# Patient Record
Sex: FEMALE | Race: BLACK OR AFRICAN AMERICAN | NOT HISPANIC OR LATINO | URBAN - METROPOLITAN AREA
[De-identification: names, ages, dates, MRNs, and addresses within clinical notes are randomized per-mention and may not be internally consistent; named-entity substitution may affect disease eponyms.]

---

## 2017-01-14 ENCOUNTER — EMERGENCY (EMERGENCY)
Facility: HOSPITAL | Age: 37
LOS: 1 days | Discharge: HOME | End: 2017-01-14
Attending: EMERGENCY MEDICINE
Payer: MEDICAID

## 2017-01-14 VITALS — HEIGHT: 65 IN | WEIGHT: 199.96 LBS

## 2017-01-14 DIAGNOSIS — J45.909 UNSPECIFIED ASTHMA, UNCOMPLICATED: ICD-10-CM

## 2017-01-14 DIAGNOSIS — F31.11 BIPOLAR DISORDER, CURRENT EPISODE MANIC WITHOUT PSYCHOTIC FEATURES, MILD: ICD-10-CM

## 2017-01-14 DIAGNOSIS — Z88.8 ALLERGY STATUS TO OTHER DRUGS, MEDICAMENTS AND BIOLOGICAL SUBSTANCES STATUS: ICD-10-CM

## 2017-01-14 DIAGNOSIS — Z91.012 ALLERGY TO EGGS: ICD-10-CM

## 2017-01-14 DIAGNOSIS — Z91.013 ALLERGY TO SEAFOOD: ICD-10-CM

## 2017-01-14 DIAGNOSIS — Z88.0 ALLERGY STATUS TO PENICILLIN: ICD-10-CM

## 2017-01-14 PROCEDURE — 94640 AIRWAY INHALATION TREATMENT: CPT

## 2017-01-14 PROCEDURE — 99283 EMERGENCY DEPT VISIT LOW MDM: CPT | Mod: 25

## 2017-01-14 PROCEDURE — 96372 THER/PROPH/DIAG INJ SC/IM: CPT

## 2017-01-14 PROCEDURE — 82962 GLUCOSE BLOOD TEST: CPT

## 2017-01-14 PROCEDURE — 99284 EMERGENCY DEPT VISIT MOD MDM: CPT | Mod: 25

## 2017-01-14 RX ORDER — ALBUTEROL 90 UG/1
2.5 AEROSOL, METERED ORAL ONCE
Qty: 0 | Refills: 0 | Status: COMPLETED | OUTPATIENT
Start: 2017-01-14 | End: 2017-01-14

## 2017-01-14 RX ORDER — DIPHENHYDRAMINE HCL 50 MG
50 CAPSULE ORAL ONCE
Qty: 0 | Refills: 0 | Status: COMPLETED | OUTPATIENT
Start: 2017-01-14 | End: 2017-01-14

## 2017-01-14 RX ORDER — ALBUTEROL 90 UG/1
2 AEROSOL, METERED ORAL
Qty: 1 | Refills: 0
Start: 2017-01-14 | End: 2017-01-21

## 2017-01-14 RX ORDER — IBUPROFEN 200 MG
600 TABLET ORAL ONCE
Qty: 0 | Refills: 0 | Status: COMPLETED | OUTPATIENT
Start: 2017-01-14 | End: 2017-01-14

## 2017-01-14 RX ADMIN — Medication 600 MILLIGRAM(S): at 03:25

## 2017-01-14 RX ADMIN — Medication 2 MILLIGRAM(S): at 02:56

## 2017-01-14 RX ADMIN — Medication 2 MILLIGRAM(S): at 02:55

## 2017-01-14 RX ADMIN — ALBUTEROL 2.5 MILLIGRAM(S): 90 AEROSOL, METERED ORAL at 03:45

## 2017-01-14 RX ADMIN — Medication 50 MILLIGRAM(S): at 02:56

## 2017-01-17 NOTE — ED PROVIDER NOTE - OBJECTIVE STATEMENT
35 yo female pmh of bipolar disorder and schizoaffective disorder comes to ed by police [place]; pt  requesting to have her medications; pt is unable to give names and dosing of medications; pt verbally abusive to staff; pt well known to ed with multiple er visits; pt redirected and agrees to have im medication  and food; pt denies suicidal or homicidal ideation at this time. pt requesting taxi for transportaton home

## 2017-01-17 NOTE — ED PROVIDER NOTE - CARE PLAN
Principal Discharge DX:	Bipolar 1 disorder, manic, mild  Secondary Diagnosis:	Uncomplicated asthma, unspecified asthma severity

## 2017-02-01 ENCOUNTER — OUTPATIENT (OUTPATIENT)
Dept: OUTPATIENT SERVICES | Facility: HOSPITAL | Age: 37
LOS: 1 days | End: 2017-02-01
Payer: MEDICAID

## 2017-02-14 DIAGNOSIS — R69 ILLNESS, UNSPECIFIED: ICD-10-CM

## 2017-08-01 PROCEDURE — G9001: CPT

## 2020-02-29 ENCOUNTER — EMERGENCY (EMERGENCY)
Facility: HOSPITAL | Age: 40
LOS: 0 days | Discharge: HOME | End: 2020-02-29
Attending: EMERGENCY MEDICINE
Payer: SELF-PAY

## 2020-02-29 VITALS
SYSTOLIC BLOOD PRESSURE: 149 MMHG | HEART RATE: 82 BPM | OXYGEN SATURATION: 99 % | RESPIRATION RATE: 18 BRPM | HEIGHT: 65 IN | DIASTOLIC BLOOD PRESSURE: 93 MMHG | WEIGHT: 209 LBS

## 2020-02-29 VITALS
SYSTOLIC BLOOD PRESSURE: 112 MMHG | RESPIRATION RATE: 17 BRPM | TEMPERATURE: 98 F | DIASTOLIC BLOOD PRESSURE: 71 MMHG | OXYGEN SATURATION: 99 % | HEART RATE: 79 BPM

## 2020-02-29 DIAGNOSIS — E11.9 TYPE 2 DIABETES MELLITUS WITHOUT COMPLICATIONS: ICD-10-CM

## 2020-02-29 DIAGNOSIS — R11.2 NAUSEA WITH VOMITING, UNSPECIFIED: ICD-10-CM

## 2020-02-29 DIAGNOSIS — Z79.84 LONG TERM (CURRENT) USE OF ORAL HYPOGLYCEMIC DRUGS: ICD-10-CM

## 2020-02-29 DIAGNOSIS — Z88.0 ALLERGY STATUS TO PENICILLIN: ICD-10-CM

## 2020-02-29 PROCEDURE — 99053 MED SERV 10PM-8AM 24 HR FAC: CPT

## 2020-02-29 PROCEDURE — 82962 GLUCOSE BLOOD TEST: CPT

## 2020-02-29 PROCEDURE — 99283 EMERGENCY DEPT VISIT LOW MDM: CPT

## 2020-02-29 RX ORDER — SODIUM CHLORIDE 9 MG/ML
1000 INJECTION INTRAMUSCULAR; INTRAVENOUS; SUBCUTANEOUS ONCE
Refills: 0 | Status: DISCONTINUED | OUTPATIENT
Start: 2020-02-29 | End: 2020-02-29

## 2020-02-29 RX ORDER — ONDANSETRON 8 MG/1
4 TABLET, FILM COATED ORAL ONCE
Refills: 0 | Status: DISCONTINUED | OUTPATIENT
Start: 2020-02-29 | End: 2020-02-29

## 2020-02-29 RX ORDER — ONDANSETRON 8 MG/1
4 TABLET, FILM COATED ORAL ONCE
Refills: 0 | Status: COMPLETED | OUTPATIENT
Start: 2020-02-29 | End: 2020-02-29

## 2020-02-29 RX ADMIN — ONDANSETRON 4 MILLIGRAM(S): 8 TABLET, FILM COATED ORAL at 04:24

## 2020-02-29 NOTE — ED PROVIDER NOTE - OBJECTIVE STATEMENT
41 y/o female in ED c/o episode of n/v tonight now resolved.   pt states visiting from [place] and ran out of her Metformin x 2 weeks.   states her FS has been normal.    pt denies any fever, HA, cp, sob, abd pain or diarrhea.   tolerating PO.   no sick contacts

## 2020-02-29 NOTE — ED PROVIDER NOTE - CONSTITUTIONAL, MLM
Well appearing, awake, alert, oriented to person, place, time/situation and in no apparent distress.  sleeping normal...

## 2020-02-29 NOTE — ED ADULT NURSE NOTE - OBJECTIVE STATEMENT
Patient complaining of nausea since 8pm, denies episodes of vomiting.  Patient sleeping/snoring immediately after speaking with patient, states she "didn't sleep tonight."  When attempting IV, patient states she "doesn't do needles."

## 2020-02-29 NOTE — ED PROVIDER NOTE - PATIENT PORTAL LINK FT
Rehab Medicine
100
You can access the FollowMyHealth Patient Portal offered by [facility] by registering at the following website: [email or web address]. By joining [employer]’s FollowMyHealth portal, you will also be able to view your health information using other applications (apps) compatible with our system.

## 2020-06-11 ENCOUNTER — EMERGENCY (EMERGENCY)
Facility: HOSPITAL | Age: 40
LOS: 1 days | Discharge: HOME | End: 2020-06-11
Attending: EMERGENCY MEDICINE
Payer: MEDICAID

## 2020-06-11 VITALS
SYSTOLIC BLOOD PRESSURE: 116 MMHG | WEIGHT: 195.11 LBS | HEART RATE: 89 BPM | OXYGEN SATURATION: 98 % | HEIGHT: 69 IN | RESPIRATION RATE: 24 BRPM | DIASTOLIC BLOOD PRESSURE: 80 MMHG

## 2020-06-11 LAB
ANION GAP SERPL CALC-SCNC: 10 MMOL/L — SIGNIFICANT CHANGE UP (ref 5–17)
APAP SERPL-MCNC: <7.5 UG/ML — LOW (ref 10–26)
BASOPHILS # BLD AUTO: 0.04 K/UL — SIGNIFICANT CHANGE UP (ref 0–0.2)
BASOPHILS NFR BLD AUTO: 0.2 % — SIGNIFICANT CHANGE UP (ref 0–2)
BUN SERPL-MCNC: 11 MG/DL — SIGNIFICANT CHANGE UP (ref 8–20)
CALCIUM SERPL-MCNC: 8.1 MG/DL — LOW (ref 8.6–10.2)
CHLORIDE SERPL-SCNC: 102 MMOL/L — SIGNIFICANT CHANGE UP (ref 98–107)
CO2 SERPL-SCNC: 25 MMOL/L — SIGNIFICANT CHANGE UP (ref 22–29)
CREAT SERPL-MCNC: 0.85 MG/DL — SIGNIFICANT CHANGE UP (ref 0.5–1.3)
EOSINOPHIL # BLD AUTO: 0.17 K/UL — SIGNIFICANT CHANGE UP (ref 0–0.5)
EOSINOPHIL NFR BLD AUTO: 1 % — SIGNIFICANT CHANGE UP (ref 0–6)
ETHANOL SERPL-MCNC: <10 MG/DL — SIGNIFICANT CHANGE UP
GLUCOSE SERPL-MCNC: 121 MG/DL — HIGH (ref 70–99)
HCG SERPL-ACNC: <4 MIU/ML — SIGNIFICANT CHANGE UP
HCT VFR BLD CALC: 34.1 % — LOW (ref 34.5–45)
HGB BLD-MCNC: 11.1 G/DL — LOW (ref 11.5–15.5)
IMM GRANULOCYTES NFR BLD AUTO: 0.4 % — SIGNIFICANT CHANGE UP (ref 0–1.5)
LYMPHOCYTES # BLD AUTO: 12.8 % — LOW (ref 13–44)
LYMPHOCYTES # BLD AUTO: 2.08 K/UL — SIGNIFICANT CHANGE UP (ref 1–3.3)
MCHC RBC-ENTMCNC: 31.9 PG — SIGNIFICANT CHANGE UP (ref 27–34)
MCHC RBC-ENTMCNC: 32.6 GM/DL — SIGNIFICANT CHANGE UP (ref 32–36)
MCV RBC AUTO: 98 FL — SIGNIFICANT CHANGE UP (ref 80–100)
MONOCYTES # BLD AUTO: 0.91 K/UL — HIGH (ref 0–0.9)
MONOCYTES NFR BLD AUTO: 5.6 % — SIGNIFICANT CHANGE UP (ref 2–14)
NEUTROPHILS # BLD AUTO: 13.01 K/UL — HIGH (ref 1.8–7.4)
NEUTROPHILS NFR BLD AUTO: 80 % — HIGH (ref 43–77)
PLATELET # BLD AUTO: 309 K/UL — SIGNIFICANT CHANGE UP (ref 150–400)
POTASSIUM SERPL-MCNC: 4 MMOL/L — SIGNIFICANT CHANGE UP (ref 3.5–5.3)
POTASSIUM SERPL-SCNC: 4 MMOL/L — SIGNIFICANT CHANGE UP (ref 3.5–5.3)
RBC # BLD: 3.48 M/UL — LOW (ref 3.8–5.2)
RBC # FLD: 13.9 % — SIGNIFICANT CHANGE UP (ref 10.3–14.5)
SALICYLATES SERPL-MCNC: <0.6 MG/DL — LOW (ref 10–20)
SODIUM SERPL-SCNC: 137 MMOL/L — SIGNIFICANT CHANGE UP (ref 135–145)
WBC # BLD: 16.27 K/UL — HIGH (ref 3.8–10.5)
WBC # FLD AUTO: 16.27 K/UL — HIGH (ref 3.8–10.5)

## 2020-06-11 PROCEDURE — 99211 OFF/OP EST MAY X REQ PHY/QHP: CPT

## 2020-06-11 PROCEDURE — 99285 EMERGENCY DEPT VISIT HI MDM: CPT | Mod: 25

## 2020-06-11 PROCEDURE — 93010 ELECTROCARDIOGRAM REPORT: CPT

## 2020-06-11 NOTE — ED PROVIDER NOTE - CLINICAL SUMMARY MEDICAL DECISION MAKING FREE TEXT BOX
39 y/o F presenting with what appears to be several self-inflicted lacerations to L forearm. Currently sedated by EMS. Will r/o medically and have pt evaluated by psych upon awakening.

## 2020-06-11 NOTE — ED ADULT NURSE NOTE - NSIMPLEMENTINTERV_GEN_ALL_ED
Implemented All Fall with Harm Risk Interventions:  [place] Line to call system. Call bell, personal items and telephone within reach. Instruct patient to call for assistance. Room bathroom lighting operational. Non-slip footwear when patient is off stretcher. Physically safe environment: no spills, clutter or unnecessary equipment. Stretcher in lowest position, wheels locked, appropriate side rails in place. Provide visual cue, wrist band, yellow gown, etc. Monitor gait and stability. Monitor for mental status changes and reorient to person, place, and time. Review medications for side effects contributing to fall risk. Reinforce activity limits and safety measures with patient and family. Provide visual clues: red socks.

## 2020-06-11 NOTE — ED ADULT NURSE REASSESSMENT NOTE - NS ED NURSE REASSESS COMMENT FT1
Received report from offgoing RN. Charting as noted. Patient in yellow gown and in no apparent distress. Patient sleeping at this time. Arousable to verbal stimuli. No signs of pain or discomfort noted. Patient remains on cardiac monitor with continuous pulse ox. See flowsheet as per vital signs. 1:1 remains at bedside for patient safety.

## 2020-06-11 NOTE — ED PROVIDER NOTE - PHYSICAL EXAMINATION
Gen: sedated  Head: normocephalic, atraumatic  Lung: CTAB, no respiratory distress, no wheezing, rales, rhonchi  CV: normal s1/s2, rrr  Abd: soft, non-distended  MSK: No edema, no visible deformities, full range of motion in all 4 extremities  Neuro: No focal neurologic deficits  Skin: No rash, multiple   Psych: normal affect Gen: sedated  Head: normocephalic, atraumatic  Lung: CTAB, no respiratory distress, no wheezing, rales, rhonchi  CV: normal s1/s2, rrr  Abd: soft, non-distended  MSK: No edema, no visible deformities, full range of motion in all 4 extremities  Neuro: No focal neurologic deficits  Skin: No rash, multiple self inflicted linear lacerations to volar aspect of L forearm, most are superficial, there is 1 deep 4cm laceration that appears old with no active bleeding, healing wounds to medial aspect of R ankle, no surrounding warmth, edema, or erythema  Psych: normal affect
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
## 2020-06-11 NOTE — ED PROVIDER NOTE - PATIENT PORTAL LINK FT
You can access the FollowMyHealth Patient Portal offered by [facility] by registering at the following website: [email or web address]. By joining [employer]’s FollowMyHealth portal, you will also be able to view your health information using other applications (apps) compatible with our system.
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
## 2020-06-11 NOTE — ED PROVIDER NOTE - OBJECTIVE STATEMENT
39 y/o F pt BIBA and PD for a psychiatric evaluation. Per EMS they state that pt became agitated at [facility] facility, PD was called, but pt was brought to the ED due to multiple lacerations to the L forarm and wound to R heel. Per EMS pt was severely agitated and received versed and haldol prior to arrival. Pt currently sedated and protecting airway. Hx limited 2/2 pt's condition.

## 2020-06-11 NOTE — ED ADULT NURSE NOTE - OBJECTIVE STATEMENT
Assumed pt care at 1245, pt received from ambulance RN, pt is sedated at this time.  Pt had received sedatives by EMS.  Pt is responsive to tactile stimuli, unable to stay awake.  CM in place, VSS, NSR.  Pt has multiple lacerations to bilat upper ext and bilat lower ankles.

## 2020-06-11 NOTE — ED BEHAVIORAL HEALTH NOTE - BEHAVIORAL HEALTH NOTE
Attempted eval earlier today requested by ED attending due to laceration to wrists, but was unable due to sedation.  Attempted again at 2100, and Pt was arousable but refused to cooperate, or keep eyes opened.  Per 1 to 1 Pt became agitated in ED earlier around change of shift and when I attempted to arrouse her as well.  Will follow.

## 2020-06-12 DIAGNOSIS — R41.82 ALTERED MENTAL STATUS, UNSPECIFIED: ICD-10-CM

## 2020-06-12 LAB
ANION GAP SERPL CALC-SCNC: 9 MMOL/L — SIGNIFICANT CHANGE UP (ref 5–17)
BASOPHILS # BLD AUTO: 0.04 K/UL — SIGNIFICANT CHANGE UP (ref 0–0.2)
BASOPHILS NFR BLD AUTO: 0.4 % — SIGNIFICANT CHANGE UP (ref 0–2)
BUN SERPL-MCNC: 9 MG/DL — SIGNIFICANT CHANGE UP (ref 8–20)
CALCIUM SERPL-MCNC: 8.2 MG/DL — LOW (ref 8.6–10.2)
CHLORIDE SERPL-SCNC: 102 MMOL/L — SIGNIFICANT CHANGE UP (ref 98–107)
CO2 SERPL-SCNC: 26 MMOL/L — SIGNIFICANT CHANGE UP (ref 22–29)
CREAT SERPL-MCNC: 0.79 MG/DL — SIGNIFICANT CHANGE UP (ref 0.5–1.3)
EOSINOPHIL # BLD AUTO: 0.19 K/UL — SIGNIFICANT CHANGE UP (ref 0–0.5)
EOSINOPHIL NFR BLD AUTO: 1.9 % — SIGNIFICANT CHANGE UP (ref 0–6)
GLUCOSE SERPL-MCNC: 96 MG/DL — SIGNIFICANT CHANGE UP (ref 70–99)
HCT VFR BLD CALC: 39.8 % — SIGNIFICANT CHANGE UP (ref 34.5–45)
HGB BLD-MCNC: 12.6 G/DL — SIGNIFICANT CHANGE UP (ref 11.5–15.5)
IMM GRANULOCYTES NFR BLD AUTO: 0.5 % — SIGNIFICANT CHANGE UP (ref 0–1.5)
LYMPHOCYTES # BLD AUTO: 1.94 K/UL — SIGNIFICANT CHANGE UP (ref 1–3.3)
LYMPHOCYTES # BLD AUTO: 19 % — SIGNIFICANT CHANGE UP (ref 13–44)
MCHC RBC-ENTMCNC: 31.1 PG — SIGNIFICANT CHANGE UP (ref 27–34)
MCHC RBC-ENTMCNC: 31.7 GM/DL — LOW (ref 32–36)
MCV RBC AUTO: 98.3 FL — SIGNIFICANT CHANGE UP (ref 80–100)
MONOCYTES # BLD AUTO: 0.75 K/UL — SIGNIFICANT CHANGE UP (ref 0–0.9)
MONOCYTES NFR BLD AUTO: 7.3 % — SIGNIFICANT CHANGE UP (ref 2–14)
NEUTROPHILS # BLD AUTO: 7.26 K/UL — SIGNIFICANT CHANGE UP (ref 1.8–7.4)
NEUTROPHILS NFR BLD AUTO: 70.9 % — SIGNIFICANT CHANGE UP (ref 43–77)
PLATELET # BLD AUTO: 316 K/UL — SIGNIFICANT CHANGE UP (ref 150–400)
POTASSIUM SERPL-MCNC: 4.5 MMOL/L — SIGNIFICANT CHANGE UP (ref 3.5–5.3)
POTASSIUM SERPL-SCNC: 4.5 MMOL/L — SIGNIFICANT CHANGE UP (ref 3.5–5.3)
RBC # BLD: 4.05 M/UL — SIGNIFICANT CHANGE UP (ref 3.8–5.2)
RBC # FLD: 13.9 % — SIGNIFICANT CHANGE UP (ref 10.3–14.5)
SARS-COV-2 RNA SPEC QL NAA+PROBE: SIGNIFICANT CHANGE UP
SODIUM SERPL-SCNC: 136 MMOL/L — SIGNIFICANT CHANGE UP (ref 135–145)
WBC # BLD: 10.23 K/UL — SIGNIFICANT CHANGE UP (ref 3.8–10.5)
WBC # FLD AUTO: 10.23 K/UL — SIGNIFICANT CHANGE UP (ref 3.8–10.5)

## 2020-06-12 PROCEDURE — 90792 PSYCH DIAG EVAL W/MED SRVCS: CPT

## 2020-06-12 PROCEDURE — 71045 X-RAY EXAM CHEST 1 VIEW: CPT | Mod: 26

## 2020-06-12 RX ORDER — KETAMINE HYDROCHLORIDE 100 MG/ML
80 INJECTION INTRAMUSCULAR; INTRAVENOUS ONCE
Refills: 0 | Status: DISCONTINUED | OUTPATIENT
Start: 2020-06-12 | End: 2020-06-12

## 2020-06-12 RX ORDER — METFORMIN HYDROCHLORIDE 850 MG/1
500 TABLET ORAL
Refills: 0 | Status: DISCONTINUED | OUTPATIENT
Start: 2020-06-12 | End: 2020-06-16

## 2020-06-12 RX ORDER — OLANZAPINE 15 MG/1
10 TABLET, FILM COATED ORAL ONCE
Refills: 0 | Status: DISCONTINUED | OUTPATIENT
Start: 2020-06-12 | End: 2020-06-12

## 2020-06-12 RX ORDER — HALOPERIDOL DECANOATE 100 MG/ML
5 INJECTION INTRAMUSCULAR ONCE
Refills: 0 | Status: COMPLETED | OUTPATIENT
Start: 2020-06-12 | End: 2020-06-12

## 2020-06-12 RX ORDER — CHLORPROMAZINE HCL 10 MG
50 TABLET ORAL EVERY 4 HOURS
Refills: 0 | Status: DISCONTINUED | OUTPATIENT
Start: 2020-06-12 | End: 2020-06-16

## 2020-06-12 RX ORDER — DIPHENHYDRAMINE HCL 50 MG
50 CAPSULE ORAL ONCE
Refills: 0 | Status: COMPLETED | OUTPATIENT
Start: 2020-06-12 | End: 2020-06-12

## 2020-06-12 RX ADMIN — Medication 110 MILLIGRAM(S): at 06:30

## 2020-06-12 RX ADMIN — Medication 50 MILLIGRAM(S): at 05:12

## 2020-06-12 RX ADMIN — Medication 2 MILLIGRAM(S): at 05:12

## 2020-06-12 RX ADMIN — Medication 100 MILLIGRAM(S): at 07:30

## 2020-06-12 RX ADMIN — HALOPERIDOL DECANOATE 5 MILLIGRAM(S): 100 INJECTION INTRAMUSCULAR at 05:25

## 2020-06-12 RX ADMIN — Medication 2 MILLIGRAM(S): at 05:25

## 2020-06-12 RX ADMIN — HALOPERIDOL DECANOATE 5 MILLIGRAM(S): 100 INJECTION INTRAMUSCULAR at 05:05

## 2020-06-12 RX ADMIN — Medication 2 MILLIGRAM(S): at 05:05

## 2020-06-12 RX ADMIN — KETAMINE HYDROCHLORIDE 80 MILLIGRAM(S): 100 INJECTION INTRAMUSCULAR; INTRAVENOUS at 06:15

## 2020-06-12 NOTE — ED BEHAVIORAL HEALTH ASSESSMENT NOTE - RISK ASSESSMENT
pt erratic, impulsive, aggressive, hostile, unable/unwilling to participate fully in evaluation at this time Unable to determine Suicide Risk

## 2020-06-12 NOTE — ED ADULT NURSE REASSESSMENT NOTE - NS ED NURSE REASSESS COMMENT FT1
pt awake ambulatory to bathroom voiding. pt speech garbled difficult to understand.  attempting to medicated with vibramycin and metformin pt requesting food before meds meal tray provided pt state I cant eat that you took it off someone else bed.. assured pt it was not.  pt refusing. pt offered sandwich cracker for medication pt refusing meds even after informing the med for abx.  pt closed eyes
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
## 2020-06-12 NOTE — ED ADULT NURSE REASSESSMENT NOTE - NS ED NURSE REASSESS COMMENT FT1
Patient sedated, airway protected, monitors placed, VSS stable. INT established in R hand using aseptic technique, labs drawn and sent, flushed with 10ml NS without complication and secured with tegaderm.

## 2020-06-12 NOTE — ED ADULT NURSE REASSESSMENT NOTE - DESCRIPTION
received report received in sleeping in room sedated 1:1 observation in effect.  unable to given po medication at this time will attempt when awake

## 2020-06-12 NOTE — ED ADULT NURSE REASSESSMENT NOTE - NS ED NURSE REASSESS COMMENT FT1
assumed care of pt, received sitting on stretcher, disorganized thought, unable to comprehend speech. pt was directable, gowned and went to room on stretcher without incident. pt screened by security per [facility] protocols. 1:1 maintained for pt and others safety due to impulsiveness.

## 2020-06-12 NOTE — ED BEHAVIORAL HEALTH NOTE - BEHAVIORAL HEALTH NOTE
SWNote: pt seen by psych NP(Vicki) pt to stay overnight to reassess in the am . Per chart, pt's name is Yakelin JIMENEZ 1980. SW to follow for dispo plan. SWNote: pt seen by psych NP(Vicki) unable to eval due to irritability/falling asleep.  Pt to stay overnight to reassess in the am . Per chart, pt's name is Yakelin JIMENEZ 1980. SW to follow for dispo plan.

## 2020-06-12 NOTE — ED ADULT NURSE REASSESSMENT NOTE - NS ED NURSE REASSESS COMMENT FT1
Patient continued to yell and curse at staff. Verbal order from Julio STEEL for 2mg Ativan and 2mg Haldol given IM at this time. Patient continued to yell and curse at staff, pt pulled out IV and throwing trash on the floor. Verbal order from Julio STEEL for 2mg Ativan and 2mg Haldol given IM at this time.

## 2020-06-12 NOTE — ED ADULT NURSE REASSESSMENT NOTE - NS ED NURSE REASSESS COMMENT FT1
Patient brought to critical care by this RN, MD Kim, and Security. Care endorsed to critical care RN. Patient remains verbally abusive and agitated. Security remains at bedside. Patient sitting on edge of bed. Side rails x2 up. Red nonslip socks remain in place. Patient refusing to wear gown at this time. Safety maintained.

## 2020-06-12 NOTE — ED BEHAVIORAL HEALTH ASSESSMENT NOTE - SUMMARY
Pt arrived by EMS after being agitated at [facility] clinic, police were called and found healed laceration marks on forearms so they referred to hospital. Since being here pt has been chronically agitated and aggressive requiring multiple injections for sedation. During a period of lucidity, reports name is Yakelin Huff : [birth year]; patient is well known to ED. She has a hx of mood disorder and cocaine abuse, malingering, c/o SI to obtain housing etc. She is irritable and appears delirious at this time. She keeps falling asleep. When awake, becomes aggressive and assaultive, inappropriately exposing self, with impulsive behavior. Pt has rec'd haldol, ativan, ketamine.   Pending urinalysis/utox  pt likely withdrawing from illicit drugs  will hold for reassessment
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
## 2020-06-12 NOTE — ED ADULT NURSE REASSESSMENT NOTE - NS ED NURSE REASSESS COMMENT FT1
Patient remains verbally abusive to staff, getting out of bed, and taking gown off. MD Kim and  called to bedside.

## 2020-06-12 NOTE — ED ADULT NURSE REASSESSMENT NOTE - NS ED NURSE REASSESS COMMENT FT1
Patient verbally abusive to staff and agitated. States "I will pee in the bed if you do not get me a different gown". Educated patient on importance of yellow gown. Patient refused and urinated in bed. MD Kim made aware.

## 2020-06-12 NOTE — ED ADULT NURSE REASSESSMENT NOTE - NS ED NURSE REASSESS COMMENT FT1
Assuming care from previous RN, pt rec'd asleep and arousable, resp even and unlabored, color good, showing NSR on monitor, pt maintaining airway and room air sat well, pt with healing wounds to LFA, pt in no acute distress at this time, 1:1 Nancy at bedside for safety, awaiting psych eval and dispo, safety maintained

## 2020-06-13 LAB
AMPHET UR-MCNC: POSITIVE
APPEARANCE UR: CLEAR — SIGNIFICANT CHANGE UP
BARBITURATES UR SCN-MCNC: NEGATIVE — SIGNIFICANT CHANGE UP
BENZODIAZ UR-MCNC: NEGATIVE — SIGNIFICANT CHANGE UP
BILIRUB UR-MCNC: NEGATIVE — SIGNIFICANT CHANGE UP
COCAINE METAB.OTHER UR-MCNC: POSITIVE
COLOR SPEC: YELLOW — SIGNIFICANT CHANGE UP
DIFF PNL FLD: ABNORMAL
EPI CELLS # UR: SIGNIFICANT CHANGE UP
GLUCOSE UR QL: NEGATIVE MG/DL — SIGNIFICANT CHANGE UP
KETONES UR-MCNC: NEGATIVE — SIGNIFICANT CHANGE UP
LEUKOCYTE ESTERASE UR-ACNC: ABNORMAL
METHADONE UR-MCNC: NEGATIVE — SIGNIFICANT CHANGE UP
NITRITE UR-MCNC: NEGATIVE — SIGNIFICANT CHANGE UP
OPIATES UR-MCNC: NEGATIVE — SIGNIFICANT CHANGE UP
PCP SPEC-MCNC: SIGNIFICANT CHANGE UP
PCP UR-MCNC: NEGATIVE — SIGNIFICANT CHANGE UP
PH UR: 5 — SIGNIFICANT CHANGE UP (ref 5–8)
PROT UR-MCNC: 15 MG/DL
RBC CASTS # UR COMP ASSIST: SIGNIFICANT CHANGE UP /HPF (ref 0–4)
SP GR SPEC: 1.02 — SIGNIFICANT CHANGE UP (ref 1.01–1.02)
THC UR QL: NEGATIVE — SIGNIFICANT CHANGE UP
UROBILINOGEN FLD QL: 4 MG/DL
WBC UR QL: SIGNIFICANT CHANGE UP

## 2020-06-13 RX ORDER — ACETAMINOPHEN 500 MG
650 TABLET ORAL ONCE
Refills: 0 | Status: COMPLETED | OUTPATIENT
Start: 2020-06-13 | End: 2020-06-13

## 2020-06-13 RX ADMIN — Medication 50 MILLIGRAM(S): at 15:40

## 2020-06-13 RX ADMIN — Medication 650 MILLIGRAM(S): at 22:53

## 2020-06-13 RX ADMIN — Medication 2 MILLIGRAM(S): at 09:30

## 2020-06-13 RX ADMIN — Medication 50 MILLIGRAM(S): at 04:09

## 2020-06-13 RX ADMIN — Medication 2 MILLIGRAM(S): at 22:54

## 2020-06-13 NOTE — ED BEHAVIORAL HEALTH NOTE - BEHAVIORAL HEALTH NOTE
This is a 49-year-old, single, mother of 2 (ages 7 and 18); non-caregiver, unemployed, domiciled alone; with a psychiatric history of Bipolar disorder; history of past psychiatric admissions; history of Alcohol and illicit drug use; brought in by EMS after the Police was called when patient became agitated at [facility] clinic. However, patient was not arrested. Patient was evaluated seen by psychiatric NP (Chandana) and was kept for reassessment. Patient was seen this morning by undersigned for dispo status.    On assessment patient is notably disorganised; with illogical and delusional thought process; unable to complete psychiatric assessment in one sitting. Required redirection to remain focus. She is noted to have multiple healed and fresh lacerations on forearms. Per ED note, patient has been agitated throughout her ED stay, which required several stat medications for agitation. Per record, patient is known to the [facility], and has had several ED visits as well as inpatient psychiatric admissions. Per collateral information this writer obtained from patient' mother (Cesia Huff [phone]). According to mother, patient was diagnosed with Bipolar disorder and has 2 children (ages 7 and 18). Patient does not live with his mother, but lives alone. Per record, she has been prescribed Latuda, Topamax and gabapentin in the past.    At 1:45pm: Patient continues to exhibit inappropriate behaviors as evidenced by exposing herself while walking back and forth with the 1:1 that is monitoring her. Patient is not cooperative with psychiatric assessment, she is highly irritable and disorganized; with delusional thought content. . Upon asking her about past suicidal attempts, she replied "yes; I tried to kill myself", and further noted "you was there; you don't remember?. Patient' speech is unclear and difficult to understand at times. She was not able to state when she was upon assessing her. She is very impulsive in her behaviors, and walks aimlessly back and forth throughout the ED. She requested to use the phone to call her mother then proceeded to call 911 and asking for the Police to come get her while giving her location as her room number, namely [facility]. However, she refused to give urine for drug screening upon asking her for a urine sample. She states "why you need urine?, and denies recent drug use.  Nonetheless, she is very disorganized and unable to reliably contract for safety. She is not able to care for herself and has lost custody of her children due to her mental health issues according to her mother (Cesia). Patient appears to be an acute risk to self, and  will benefit from inpatient psychiatric admission for stabilization and safety.     Dx: Bipolar disorder, current episode manic, severe, with psychotic features (F31.2)     PLAN:   1. Admit 2PC to inpatient psychiatry when a  bed becomes available.   2. Will continue on 1:1 observation until transfer to inpatient psychiatry.  3. Will continue Ativan 2mg Q 6 hours as needed for anxiety.  4. Inpatient psychiatry will initiate additional psychotropic meds as deemed appropriate.    Vital Signs Last 24 Hrs  T(C): 37.2 (12 Jun 2020 21:30), Max: 37.2 (12 Jun 2020 21:30)  T(F): 98.9 (12 Jun 2020 21:30), Max: 98.9 (12 Jun 2020 21:30)  HR: 109 (13 Jun 2020 12:46) (91 - 109)  BP: 102/69 (12 Jun 2020 21:30) (102/69 - 102/69)  BP(mean): --  RR: 19 (13 Jun 2020 12:46) (18 - 19)  SpO2: 98% (13 Jun 2020 12:46) (96% - 98%)    CBC Full  -  ( 12 Jun 2020 06:52 )  WBC Count : 10.23 K/uL  RBC Count : 4.05 M/uL  Hemoglobin : 12.6 g/dL  Hematocrit : 39.8 %  Platelet Count - Automated : 316 K/uL  Mean Cell Volume : 98.3 fl  Mean Cell Hemoglobin : 31.1 pg  Mean Cell Hemoglobin Concentration : 31.7 gm/dL  Auto Neutrophil # : 7.26 K/uL  Auto Lymphocyte # : 1.94 K/uL  Auto Monocyte # : 0.75 K/uL  Auto Eosinophil # : 0.19 K/uL  Auto Basophil # : 0.04 K/uL  Auto Neutrophil % : 70.9 %  Auto Lymphocyte % : 19.0 %  Auto Monocyte % : 7.3 %  Auto Eosinophil % : 1.9 %  Auto Basophil % : 0.4 %  06-12    136  |  102  |  9.0  ----------------------------<  96  4.5   |  26.0  |  0.79    Ca    8.2<L>      12 Jun 2020 06:52

## 2020-06-13 NOTE — ED ADULT NURSE REASSESSMENT NOTE - NS ED NURSE REASSESS COMMENT FT1
Assumed care of patient. patient alert and remains uncooperative. patient remains on 1:1 for patient safety.  patient requesting multiple time for food and po fluids. patient exiting room with gown open to the front. patient instructed that she is not appropriated and that if she leaves her room she must have gown coving her front.  patient states understanding of same.  Will monitor and chart changes maintaied on 1:1 and maintain safe environment

## 2020-06-13 NOTE — ED ADULT NURSE REASSESSMENT NOTE - NS ED NURSE REASSESS COMMENT FT1
Pt became agitated, banging on doors, threatening staff. deescalation techniques used unsuccessfully. Code gray activated. Pt medicated w/ Thorazine 50mg IM  for safety. Pt currently resting/ sleeping comfortably, in no distress. No harm to self or others. Safety maintained.

## 2020-06-13 NOTE — ED ADULT NURSE REASSESSMENT NOTE - NS ED NURSE REASSESS COMMENT FT1
assumed care of pt in BH area. pt pacing around in common area, refusing medications. breakfast provided. pt with lacerations to left forearm, holding guaze on it, pt showing to RN but refusing care to the wound. pt remains on 1:1 observation. will continue to monitor. assumed care of pt in  area. pt pacing around in common area, refusing medications. breakfast provided. difficulty to understand slurred quick speech. pt with lacerations to left forearm, holding guaze on it, pt showing to RN but refusing care to the wound. pt remains on 1:1 observation. will continue to monitor.

## 2020-06-13 NOTE — ED ADULT NURSE REASSESSMENT NOTE - REASSESS COMMUNICATION
pt continues to pace around ED, undressing from gown, remains with many request. 1:1 remains with pt. will continue to monitor.

## 2020-06-13 NOTE — ED ADULT NURSE REASSESSMENT NOTE - REASSESS COMMUNICATION
pt took off bandage to left forearm, refusing to let RN address the multiple lacerations, including one large one appears to be healing. pt refusing VS. pt took off bandage to left forearm, refusing to let RN address the multiple lacerations present, including one large one appears to be old and in the healing process. multiple scars present to left forearm as well. pt refusing VS.

## 2020-06-13 NOTE — ED ADULT NURSE REASSESSMENT NOTE - NS ED NURSE REASSESS COMMENT FT1
pt awoke speech remains garbled difficult to understand. rambling at times.  escorted to br. return to hallway by room.  pt undressed instructed to put gown back on pt refusing to put on the yellow thing.  walking in unit nude security called.   pt babbling walked back into room refused to put gown on.  returned to bed covered self closed eyes. will continue to monitor

## 2020-06-13 NOTE — ED ADULT NURSE REASSESSMENT NOTE - NS ED NURSE REASSESS COMMENT FT1
awake oob standing w/out clothing on garbled speech. made aware will se mir in am.  pt requesting phone inform once she puts on gown she will received phone  stating I am not putting it on.  pt climbed back into bed eyes closed.

## 2020-06-13 NOTE — ED ADULT NURSE REASSESSMENT NOTE - NS ED NURSE REASSESS COMMENT FT1
patient being loud requesting to bath  patient assisted as needed. patient remains on 1:1 ambulating without difficulties.  patient having disorganized and illogical thoughts at time patient needing redirection will monitor and chart changes maintain safe environment

## 2020-06-13 NOTE — CHART NOTE - NSCHARTNOTEFT_GEN_A_CORE
RAYMON Note: Per psych team, pt is in need of inpt psych trx on involuntary status. Pt listed as self pay- pt unable to meaningfully engage In conversations regarding insurance. RAYMON paged MARGARITA at [facility] to inquire about bed availability- received call back from MARGARITA Stahl- who reports there are no beds available today, RAYMON continues to follow for safe trx RAYMON Note: Per psych team, pt is in need of inpt psych trx on involuntary status, 2PC legals completed and on chart. Pt listed as self pay- pt stating she has no insurance. RAYMON paged A.MO at [facility] to inquire about bed availability- received call back from MARGARITA Stahl- who reports there are no beds available today, advised to call tomorrow. RAYMON continues to follow for safe trx

## 2020-06-13 NOTE — ED ADULT NURSE REASSESSMENT NOTE - NS ED NURSE REASSESS COMMENT FT1
patient requesting that wound to left forearm be cover and nonadhersive dressing and gauze pad applied no redness or drainage noted. patient to bed maintained on 1:1.  will monitor and chart changes maintain safe environment

## 2020-06-14 RX ORDER — QUETIAPINE FUMARATE 200 MG/1
50 TABLET, FILM COATED ORAL ONCE
Refills: 0 | Status: COMPLETED | OUTPATIENT
Start: 2020-06-14 | End: 2020-06-14

## 2020-06-14 RX ORDER — DIPHENHYDRAMINE HCL 50 MG
50 CAPSULE ORAL EVERY 6 HOURS
Refills: 0 | Status: DISCONTINUED | OUTPATIENT
Start: 2020-06-14 | End: 2020-06-16

## 2020-06-14 RX ORDER — HALOPERIDOL DECANOATE 100 MG/ML
5 INJECTION INTRAMUSCULAR EVERY 6 HOURS
Refills: 0 | Status: DISCONTINUED | OUTPATIENT
Start: 2020-06-14 | End: 2020-06-16

## 2020-06-14 RX ADMIN — Medication 50 MILLIGRAM(S): at 14:24

## 2020-06-14 RX ADMIN — Medication 100 MILLIGRAM(S): at 05:48

## 2020-06-14 RX ADMIN — Medication 500 MILLIGRAM(S): at 04:34

## 2020-06-14 RX ADMIN — Medication 50 MILLIGRAM(S): at 14:25

## 2020-06-14 RX ADMIN — Medication 2 MILLIGRAM(S): at 14:26

## 2020-06-14 RX ADMIN — QUETIAPINE FUMARATE 50 MILLIGRAM(S): 200 TABLET, FILM COATED ORAL at 11:10

## 2020-06-14 RX ADMIN — Medication 50 MILLIGRAM(S): at 01:10

## 2020-06-14 RX ADMIN — Medication 100 MILLIGRAM(S): at 21:20

## 2020-06-14 RX ADMIN — HALOPERIDOL DECANOATE 5 MILLIGRAM(S): 100 INJECTION INTRAMUSCULAR at 14:25

## 2020-06-14 RX ADMIN — METFORMIN HYDROCHLORIDE 500 MILLIGRAM(S): 850 TABLET ORAL at 05:48

## 2020-06-14 RX ADMIN — METFORMIN HYDROCHLORIDE 500 MILLIGRAM(S): 850 TABLET ORAL at 21:16

## 2020-06-14 RX ADMIN — Medication 2 MILLIGRAM(S): at 11:17

## 2020-06-14 NOTE — ED ADULT NURSE REASSESSMENT NOTE - NS ED NURSE REASSESS COMMENT FT1
patient washed self in bathroom with 1:1 present.. clean linens given patient states that she was hungry and food provided. patient

## 2020-06-14 NOTE — ED ADULT NURSE REASSESSMENT NOTE - NS ED NURSE REASSESS COMMENT FT1
pt attempting to block door with chair and placed blanket over door window.  both removed from room pt informed cant block door.  pt requested grill cheese sandwich dietary called tray brought to pt room
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
## 2020-06-14 NOTE — ED ADULT NURSE REASSESSMENT NOTE - NS ED NURSE REASSESS COMMENT FT1
Pt with 1;1 observation in place. Pt continues to pace intermittently throughout unit. Pt requesting to be re-evaluated because she states that she is ready to go home. pt states that has not slept in 2 days. Encouraged to try to rest. Pt otherwise calm and cooperative with treatment plan. Ongoing evaluation in progress.

## 2020-06-14 NOTE — ED ADULT NURSE REASSESSMENT NOTE - NS ED NURSE REASSESS COMMENT FT1
walking around unit somewhat quieter. no further yelling or pacing. requesting and received po fluid and snack

## 2020-06-14 NOTE — ED ADULT NURSE REASSESSMENT NOTE - NS ED NURSE REASSESS COMMENT FT1
patient states that she had copd and is having difficulty breathing v/s taken and Dr. Benson notified

## 2020-06-14 NOTE — ED ADULT NURSE REASSESSMENT NOTE - NS ED NURSE REASSESS COMMENT FT1
patient sleeping with 1:1 at bedside.  side rails up. Will monitor and chart changes and maintain safe environment

## 2020-06-14 NOTE — ED ADULT NURSE REASSESSMENT NOTE - NS ED NURSE REASSESS COMMENT FT1
awake search in room for something pt not talking with writer at this time left wandering in room searching taking gown on off

## 2020-06-14 NOTE — ED ADULT NURSE REASSESSMENT NOTE - NS ED NURSE REASSESS COMMENT FT1
pt droswy ambulating in room. multiple attempts made encouraging pt to sit down or lay down.  pt becoming louder stating don't tell me what to do

## 2020-06-14 NOTE — CHART NOTE - NSCHARTNOTEFT_GEN_A_CORE
SW Note: Pt is in need of inpt psych bed on involuntary status. Pt is uninsured. Per PHILL Hamlin, no beds are available. SW expanded search, reached out to all facilities in [facility] ([facility], [facility], [facility], [facility], [facility], [facility], [facility] and [facility] currently closed due to covid) as well as [facility] and [facility], no bed availability today. RAYMON continues to search for inpt bed

## 2020-06-14 NOTE — ED ADULT NURSE REASSESSMENT NOTE - NS ED NURSE REASSESS COMMENT FT1
received report pt received ambulatory in unit one to one in effect.  pt is intrusive manic.  speech is improved.  pt is demanding.  constantly requiring attention.  conversation is inappropriate  at times.  attempting to set limits 1:1 observation in effect . pt medicated. with seroquel at  np nicoles request.  pt making deals for taking medication. etc.

## 2020-06-14 NOTE — ED ADULT NURSE REASSESSMENT NOTE - NS ED NURSE REASSESS COMMENT FT1
pt awake walking around yelling at staff afgain and knocking on glass wanting to speak with sw again.  has spoke with sw but denies.  pt returned to room laying down

## 2020-06-14 NOTE — ED BEHAVIORAL HEALTH NOTE - BEHAVIORAL HEALTH NOTE
PROGRESS NOTE: 20 @ 22:35  	  • Reason for Ongoing Consultation: 	    ID: 40yyo Female with HEALTH ISSUES - PROBLEM Dx:  Altered mental status, unspecified altered mental status type          INTERVAL DATA:   • Interval Chief Complaint:  "I'm not crazy.  I did crack.  That short ben here gave me ecstasy".  Pt continues pacing and intrusive, banging on windows.  Pt loud at times shouting at 1 to 1.  Continues exposing herself and requires a lot of redirection regarding clothing and keeping herself covered.  Pt asking repeatedly for food.  Pt required IM sedation earlier today and slept for a short while.  Speech continues garbled but less pressured.  Continues Labile.  REVIEW OF SYSTEMS:   • Constitutional Symptoms	No complaints  • Eyes	No complaints  • Ears / Nose / Throat / Mouth	No complaints  • Cardiovascular	No complaints  • Respiratory	No complaints  • Gastrointestinal	No complaints  • Genitourinary	No complaints  • Musculoskeletal	No complaints  • Skin	No complaints  • Neurological	No complaints  • Psychiatric (see HPI)	See HPI  • Endocrine	No complaints  • Hematologic / Lymphatic	No complaints  • Allergic / Immunologic	No complaints    REVIEW OF VITALS/LABS/IMAGING/INVESTIGATIONS:   • Vital signs reviewed: Yes  • Vital Signs:	    T(C): 36.4 (20 @ 15:49), Max: 37.3 (20 @ 00:39)  HR: 98 (20 @ 15:49) (98 - 117)  BP: 146/88 (20 @ 15:49) (114/77 - 146/88)  RR: 17 (20 @ 15:49) (17 - 19)  SpO2: 99% (20 @ 15:49) (98% - 100%)    • Available labs reviewed: Yes  • Available Lab Results:                 Urinalysis Basic - ( 2020 22:19 )    Color: Yellow / Appearance: Clear / S.025 / pH: x  Gluc: x / Ketone: Negative  / Bili: Negative / Urobili: 4 mg/dL   Blood: x / Protein: 15 mg/dL / Nitrite: Negative   Leuk Esterase: Trace / RBC: 0-2 /HPF / WBC 0-2   Sq Epi: x / Non Sq Epi: Few / Bacteria: x          MEDICATIONS:      PRN Medications: Haldol 5 IM Ativan 2 IM and Benadryl 50 IM for agitation  • PRN Medications since last evaluation	  • PRN Details	    Current Medications:   chlorproMAZINE    Injectable 50 milliGRAM(s) IntraMuscular every 4 hours PRN  diphenhydrAMINE   Injectable 50 milliGRAM(s) IntraMuscular every 6 hours PRN  doxycycline hyclate Capsule 100 milliGRAM(s) Oral every 12 hours  haloperidol    Injectable 5 milliGRAM(s) IntraMuscular every 6 hours PRN  LORazepam     Tablet 2 milliGRAM(s) Oral every 6 hours PRN  LORazepam   Injectable 2 milliGRAM(s) IntraMuscular every 6 hours PRN  metFORMIN 500 milliGRAM(s) Oral two times a day     Medication Side Effects:  • Medication Side Effects or Adverse Reactions (new or ongoing)	None known    MENTAL STATUS EXAM:   • Level of Consciousness	Alert  • General Appearance	Well developed  • Body Habitus	Well nourished  • Hygiene	fair  • Grooming	poor  • Behavior	min Cooperative  • Eye Contact	fair  • Relatedness	poor  • Impulse Control	impaired  • Muscle Tone / Strength	Normal muscle tone/strength  • Abnormal Movements	No abnormal movements  • Gait / Station	Normal gait / station  • Speech Volume	Normal to loud  • Speech Rate	Normal  • Speech Spontaneity	Normal  • Speech Articulation	Normal  • Mood	expansive  • Affect Quality	labile   • Affect Range	Full /sedated  • Affect Congruence	Congruent  • Thought Process	disorganized  • Thought Associations	Normal  • Thought Content	Unremarkable  • Perceptions	paranoid  • Oriented to Time	no  • Oriented to Place	Yes  • Oriented to Situation	limited  • Oriented to Person	Yes  • Attention / Concentration	impaired  • Estimated Intelligence	Average  • Recent Memory	Normal  • Remote Memory	Normal  • Fund of Knowledge	impaired  • Language	No abnormalities noted  • Judgment (regarding everyday events)	poor  • Insight (regarding psychiatric illness)	Fair    SUICIDALITY:   • Suicidality (Interval)	none known    HOMICIDALITY/AGGRESSION:   • Homicidality/Aggression	none known    DIAGNOSIS DSM-V:    Psychiatric Diagnosis (Corresponds to DSM-IV Axis I, II):   HEALTH ISSUES - PROBLEM Dx:  Altered mental status, unspecified altered mental status type           Medical Diagnosis (Corresponds to DSM-IV Axis III):  • Axis III	DM      ASSESSMENT OF CURRENT CONDITION:   Summary (include case differential, formulation and patient response to therapy):   Pt rewuire in pt psych admission for mood irritability and psychosis  Risk Assessment (consider static vs modifiable risk factors and protective factors; comment on level of risk for dangerous behavior):   High risk behaviors, drug use, noncompliance  PLAN  Involuntary psych admission

## 2020-06-14 NOTE — ED ADULT NURSE REASSESSMENT NOTE - NS ED NURSE REASSESS COMMENT FT1
patient got out of bed urinated on floor.  patient with garbles pressured and garbles speech. patient redirected multiple time. patient denies eating dinner and reassured by staff member that she did. limit setting attempted. patient instructed to stay in her room.  1:1 at bedside.  Safe environment maintained

## 2020-06-15 VITALS
HEART RATE: 87 BPM | DIASTOLIC BLOOD PRESSURE: 70 MMHG | RESPIRATION RATE: 19 BRPM | SYSTOLIC BLOOD PRESSURE: 134 MMHG | OXYGEN SATURATION: 99 %

## 2020-06-15 PROCEDURE — 36415 COLL VENOUS BLD VENIPUNCTURE: CPT

## 2020-06-15 PROCEDURE — 80048 BASIC METABOLIC PNL TOTAL CA: CPT

## 2020-06-15 PROCEDURE — 71045 X-RAY EXAM CHEST 1 VIEW: CPT

## 2020-06-15 PROCEDURE — 99285 EMERGENCY DEPT VISIT HI MDM: CPT | Mod: 25

## 2020-06-15 PROCEDURE — 87635 SARS-COV-2 COVID-19 AMP PRB: CPT

## 2020-06-15 PROCEDURE — 93005 ELECTROCARDIOGRAM TRACING: CPT

## 2020-06-15 PROCEDURE — 99213 OFFICE O/P EST LOW 20 MIN: CPT

## 2020-06-15 PROCEDURE — 85027 COMPLETE CBC AUTOMATED: CPT

## 2020-06-15 PROCEDURE — 80307 DRUG TEST PRSMV CHEM ANLYZR: CPT

## 2020-06-15 PROCEDURE — 96372 THER/PROPH/DIAG INJ SC/IM: CPT | Mod: XU

## 2020-06-15 PROCEDURE — 96365 THER/PROPH/DIAG IV INF INIT: CPT

## 2020-06-15 PROCEDURE — 81001 URINALYSIS AUTO W/SCOPE: CPT

## 2020-06-15 PROCEDURE — 84702 CHORIONIC GONADOTROPIN TEST: CPT

## 2020-06-15 PROCEDURE — 96375 TX/PRO/DX INJ NEW DRUG ADDON: CPT

## 2020-06-15 RX ADMIN — Medication 2 MILLIGRAM(S): at 04:10

## 2020-06-15 RX ADMIN — HALOPERIDOL DECANOATE 5 MILLIGRAM(S): 100 INJECTION INTRAMUSCULAR at 04:10

## 2020-06-15 RX ADMIN — Medication 100 MILLIGRAM(S): at 06:54

## 2020-06-15 RX ADMIN — Medication 50 MILLIGRAM(S): at 04:10

## 2020-06-15 NOTE — CHART NOTE - NSCHARTNOTEFT_GEN_A_CORE
SW Note: pt was seen again by the  provider. Pt cleared for discharge. Pt did not wait on the unit for SW assessment for aftercare needs.

## 2020-06-15 NOTE — ED ADULT NURSE REASSESSMENT NOTE - NS ED NURSE REASSESS COMMENT FT1
patient got out of bed removed gown and then proceeded to urinate on floor and walk thought it. patient remains on 1:1 and attempted to be redirected without success patient then got back into bed and went back to sleep. environment cleaned and maintained safe

## 2020-06-15 NOTE — ED ADULT NURSE REASSESSMENT NOTE - NS ED NURSE REASSESS COMMENT FT1
Patient ate 100% of her breakfast.  No signs or symptoms noted at wound sites.  Patient safety maintained on constant observation.  Patient currently resting in bed.

## 2020-06-15 NOTE — ED ADULT NURSE REASSESSMENT NOTE - NS ED NURSE REASSESS COMMENT FT1
patient requesting dressing be applied to left forearm.  When attempted to applied patient refused patient requesting dressing be applied to left forearm.  When attempted to applied patient refused. patient speaking with pressured and garbled speech

## 2020-06-15 NOTE — ED ADULT NURSE REASSESSMENT NOTE - NS ED NURSE REASSESS COMMENT FT1
Patient ate 100% of her meal.  Constant observation discontinued.  Patient cleared for discharge by Dr. Acosta.  Patient wound dressed with sterile dressing and educated about signs and symptoms of infection.  Patient will obtain antidotic medication at [facility] post discharge.

## 2020-06-15 NOTE — ED ADULT NURSE REASSESSMENT NOTE - GENERAL PATIENT STATE
resting/sleeping
anxious
resting/sleeping
cooperative/comfortable appearance
resting/sleeping
resting/sleeping
resting/sleeping/comfortable appearance

## 2020-06-15 NOTE — ED ADULT NURSE REASSESSMENT NOTE - STATUS
HOLD for reassessment
awaiting discharge, no change
awaiting transfer, no change

## 2020-06-15 NOTE — ED ADULT NURSE REASSESSMENT NOTE - NS ED NURSE REASSESS COMMENT FT1
Assumed care of patient at 0715.  Patient currently resting in bed.  Constant observation maintained for patient safety.

## 2020-06-15 NOTE — ED BEHAVIORAL HEALTH NOTE - BEHAVIORAL HEALTH NOTE
PROGRESS NOTE: 06-15-20 @ 10:29  	  • Reason for Ongoing Consultation: 	re-evaluation    ID: 40yyo Female with HEALTH ISSUES - PROBLEM Dx:  Altered mental status, unspecified altered mental status type          INTERVAL DATA:   • Interval Chief Complaint: I ain't crazy I smoked crack The short ben gave ecstasy  • Interval History:   agitation has diminished asking for discharge Wants bandage for cuts on arm and offers to pay for her antibiotic  REVIEW OF SYSTEMS:   • Constitutional Symptoms	No complaints  • Eyes	No complaints  • Ears / Nose / Throat / Mouth	No complaints  • Cardiovascular	No complaints  • Respiratory	No complaints  • Gastrointestinal	No complaints  • Genitourinary	No complaints  • Musculoskeletal	No complaints  • Skin	No complaints  • Neurological	No complaints  • Psychiatric (see HPI)	See HPI  • Endocrine	No complaints  • Hematologic / Lymphatic	No complaints  • Allergic / Immunologic	No complaints    REVIEW OF VITALS/LABS/IMAGING/INVESTIGATIONS:   • Vital signs reviewed: Yes  • Vital Signs:	    T(C): 36.4 (20 @ 15:49), Max: 36.4 (20 @ 15:49)  HR: 87 (06-15-20 @ 04:00) (87 - 98)  BP: 134/70 (06-15-20 @ 04:00) (134/70 - 146/88)  RR: 19 (06-15-20 @ 04:00) (17 - 19)  SpO2: 99% (06-15-20 @ 04:00) (99% - 99%)    • Available labs reviewed: Yes  • Available Lab Results:                 Urinalysis Basic - ( 2020 22:19 )    Color: Yellow / Appearance: Clear / S.025 / pH: x  Gluc: x / Ketone: Negative  / Bili: Negative / Urobili: 4 mg/dL   Blood: x / Protein: 15 mg/dL / Nitrite: Negative   Leuk Esterase: Trace / RBC: 0-2 /HPF / WBC 0-2   Sq Epi: x / Non Sq Epi: Few / Bacteria: x          MEDICATIONS:      PRN Medications:  • PRN Medications since last evaluation	  • PRN Details	    Current Medications:   chlorpromazine    Injectable 50 milliGRAM(s) IntraMuscular every 4 hours PRN  diphenhydramine   Injectable 50 milliGRAM(s) IntraMuscular every 6 hours PRN  doxycycline hyclate Capsule 100 milliGRAM(s) Oral every 12 hours  haloperidol    Injectable 5 milliGRAM(s) IntraMuscular every 6 hours PRN  lorazepam     Tablet 2 milliGRAM(s) Oral every 6 hours PRN  lorazepam   Injectable 2 milliGRAM(s) IntraMuscular every 6 hours PRN  metformin 500 milliGRAM(s) Oral two times a day     Medication Side Effects:  • Medication Side Effects or Adverse Reactions (new or ongoing)	None known    MENTAL STATUS EXAM:   • Level of Consciousness	Alert  • General Appearance	Well developed  • Body Habitus	Well nourished  • Hygiene	fair  • Grooming	fair  • Behavior	Cooperative  • Eye Contact	Good  • Relatedness	Good  • Impulse Control	Normal  • Muscle Tone / Strength	Normal muscle tone/strength  • Abnormal Movements	No abnormal movements  • Gait / Station	Normal gait / station  • Speech Volume	Normal  • Speech Rate	Normal  • Speech Spontaneity	Normal  • Speech Articulation	Normal  • Mood	Normal  • Affect Quality	Euthymic  • Affect Range	Full  • Affect Congruence	Congruent  • Thought Process	Linear  • Thought Associations	Normal  • Thought Content	Unremarkable  • Perceptions	No abnormalities  • Oriented to Time	Yes  • Oriented to Place	Yes  • Oriented to Situation	Yes  • Oriented to Person	Yes  • Attention / Concentration	Normal  • Estimated Intelligence	Average  • Recent Memory	Normal  • Remote Memory	Normal  • Fund of Knowledge	Normal  • Language	No abnormalities noted  • Judgment (regarding everyday events)	Fair  • Insight (regarding psychiatric illness)	Fair    SUICIDALITY:   • Suicidality (Interval)	none known    HOMICIDALITY/AGGRESSION:   • Homicidality/Aggression	none known    DIAGNOSIS DSM-V:    Psychiatric Diagnosis (Corresponds to DSM-IV Axis I, II):   HEALTH ISSUES - PROBLEM Dx:  Altered mental status, unspecified altered mental status type           Medical Diagnosis (Corresponds to DSM-IV Axis III):  • Axis III	  type 2 diabetes    ASSESSMENT OF CURRENT CONDITION:   Summary (include case differential, formulation and patient response to therapy): drug induced state has resolved    Risk Assessment (consider static vs modifiable risk factors and protective factors; comment on level of risk for dangerous behavior): low suicidal risk    PLAN  discharge home today  outpatient substance abuse services  Rx sent to [facility] for doxycycline

## 2020-06-15 NOTE — ED ADULT NURSE REASSESSMENT NOTE - NS ED NURSE REASSESS COMMENT FT1
patient out of bed being uncooperative requesting food and when given what was available patient started to get aggressive. Security called;  patient threw food that was offered. patient refusing to wear gown patient instructed that she has to stay in room if not wearing gown.  patient ambulated to bathroom with gown on and patient directed to room and  medicated for agitation with security present.  patient removed dressing to left forearm and refused to have new dressing applied. patient out of bed being uncooperative requesting food and when given what was available patient started to get aggressive. Security called;  patient threw food that was offered. patient refusing to wear gown patient instructed that she has to stay in room if not wearing gown.  patient ambulated to bathroom with gown on and patient directed to room and  medicated for agitation with security present.  patient removed dressing to left forearm and refused to have new dressing applied.  patient maintained on 1:1 for safety

## 2020-06-15 NOTE — ED ADULT NURSE REASSESSMENT NOTE - COMFORT CARE
meal provided
meal provided
darkened lights
meal provided/plan of care explained
plan of care explained/meal provided
side rails up/warm blanket provided

## 2020-06-17 ENCOUNTER — EMERGENCY (EMERGENCY)
Facility: HOSPITAL | Age: 40
LOS: 1 days | Discharge: HOME | End: 2020-06-17
Attending: EMERGENCY MEDICINE
Payer: MEDICAID

## 2020-06-17 LAB
ALBUMIN SERPL ELPH-MCNC: 3.3 G/DL — SIGNIFICANT CHANGE UP (ref 3.3–5.2)
ALP SERPL-CCNC: 42 U/L — SIGNIFICANT CHANGE UP (ref 40–120)
ALT FLD-CCNC: 19 U/L — SIGNIFICANT CHANGE UP
ANION GAP SERPL CALC-SCNC: 12 MMOL/L — SIGNIFICANT CHANGE UP (ref 5–17)
APAP SERPL-MCNC: <7.5 UG/ML — LOW (ref 10–26)
AST SERPL-CCNC: 32 U/L — HIGH
BILIRUB SERPL-MCNC: <0.2 MG/DL — LOW (ref 0.4–2)
BUN SERPL-MCNC: 25 MG/DL — HIGH (ref 8–20)
CALCIUM SERPL-MCNC: 9.1 MG/DL — SIGNIFICANT CHANGE UP (ref 8.6–10.2)
CHLORIDE SERPL-SCNC: 103 MMOL/L — SIGNIFICANT CHANGE UP (ref 98–107)
CO2 SERPL-SCNC: 23 MMOL/L — SIGNIFICANT CHANGE UP (ref 22–29)
CREAT SERPL-MCNC: 1.12 MG/DL — SIGNIFICANT CHANGE UP (ref 0.5–1.3)
ETHANOL SERPL-MCNC: <10 MG/DL — SIGNIFICANT CHANGE UP
GLUCOSE SERPL-MCNC: 127 MG/DL — HIGH (ref 70–99)
HCT VFR BLD CALC: 34.9 % — SIGNIFICANT CHANGE UP (ref 34.5–45)
HGB BLD-MCNC: 11 G/DL — LOW (ref 11.5–15.5)
MCHC RBC-ENTMCNC: 30.8 PG — SIGNIFICANT CHANGE UP (ref 27–34)
MCHC RBC-ENTMCNC: 31.5 GM/DL — LOW (ref 32–36)
MCV RBC AUTO: 97.8 FL — SIGNIFICANT CHANGE UP (ref 80–100)
PLATELET # BLD AUTO: 391 K/UL — SIGNIFICANT CHANGE UP (ref 150–400)
POTASSIUM SERPL-MCNC: 4 MMOL/L — SIGNIFICANT CHANGE UP (ref 3.5–5.3)
POTASSIUM SERPL-SCNC: 4 MMOL/L — SIGNIFICANT CHANGE UP (ref 3.5–5.3)
PROT SERPL-MCNC: 7.3 G/DL — SIGNIFICANT CHANGE UP (ref 6.6–8.7)
RBC # BLD: 3.57 M/UL — LOW (ref 3.8–5.2)
RBC # FLD: 13.4 % — SIGNIFICANT CHANGE UP (ref 10.3–14.5)
SALICYLATES SERPL-MCNC: <0.6 MG/DL — LOW (ref 10–20)
SODIUM SERPL-SCNC: 138 MMOL/L — SIGNIFICANT CHANGE UP (ref 135–145)
WBC # BLD: 8.79 K/UL — SIGNIFICANT CHANGE UP (ref 3.8–10.5)
WBC # FLD AUTO: 8.79 K/UL — SIGNIFICANT CHANGE UP (ref 3.8–10.5)

## 2020-06-17 PROCEDURE — 99285 EMERGENCY DEPT VISIT HI MDM: CPT | Mod: 25

## 2020-06-17 PROCEDURE — 72170 X-RAY EXAM OF PELVIS: CPT | Mod: 26

## 2020-06-17 PROCEDURE — 12002 RPR S/N/AX/GEN/TRNK2.6-7.5CM: CPT

## 2020-06-17 RX ORDER — TETANUS TOXOID, REDUCED DIPHTHERIA TOXOID AND ACELLULAR PERTUSSIS VACCINE, ADSORBED 5; 2.5; 8; 8; 2.5 [IU]/.5ML; [IU]/.5ML; UG/.5ML; UG/.5ML; UG/.5ML
0.5 SUSPENSION INTRAMUSCULAR ONCE
Refills: 0 | Status: COMPLETED | OUTPATIENT
Start: 2020-06-17 | End: 2020-06-17

## 2020-06-17 RX ORDER — MIDAZOLAM HYDROCHLORIDE 1 MG/ML
10 INJECTION, SOLUTION INTRAMUSCULAR; INTRAVENOUS ONCE
Refills: 0 | Status: DISCONTINUED | OUTPATIENT
Start: 2020-06-17 | End: 2020-06-17

## 2020-06-17 RX ADMIN — MIDAZOLAM HYDROCHLORIDE 10 MILLIGRAM(S): 1 INJECTION, SOLUTION INTRAMUSCULAR; INTRAVENOUS at 22:22
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
## 2020-06-17 NOTE — ED PROVIDER NOTE - PROGRESS NOTE DETAILS
Julian: Foreign body visualized on XR, pt was able to manually remove a glass pipe herself. Pt agitated this am. Versed ordered. Pt on AOU awaiting in patient psychiatric bed for definitive care.
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
## 2020-06-17 NOTE — ED PROVIDER NOTE - AOU DETAILS
pt with AMS/Erratic behavior  needs psych eval but unable to participate at this time  aou status pending eval
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
## 2020-06-18 VITALS
TEMPERATURE: 98 F | SYSTOLIC BLOOD PRESSURE: 110 MMHG | DIASTOLIC BLOOD PRESSURE: 74 MMHG | OXYGEN SATURATION: 98 % | RESPIRATION RATE: 19 BRPM | HEART RATE: 92 BPM

## 2020-06-18 DIAGNOSIS — F48.9 NONPSYCHOTIC MENTAL DISORDER, UNSPECIFIED: ICD-10-CM

## 2020-06-18 DIAGNOSIS — F14.90 COCAINE USE, UNSPECIFIED, UNCOMPLICATED: ICD-10-CM

## 2020-06-18 DIAGNOSIS — F31.9 BIPOLAR DISORDER, UNSPECIFIED: ICD-10-CM

## 2020-06-18 PROCEDURE — 90792 PSYCH DIAG EVAL W/MED SRVCS: CPT

## 2020-06-18 RX ORDER — METFORMIN HYDROCHLORIDE 850 MG/1
500 TABLET ORAL ONCE
Refills: 0 | Status: COMPLETED | OUTPATIENT
Start: 2020-06-18 | End: 2020-06-18

## 2020-06-18 RX ORDER — OLANZAPINE 15 MG/1
10 TABLET, FILM COATED ORAL ONCE
Refills: 0 | Status: COMPLETED | OUTPATIENT
Start: 2020-06-18 | End: 2020-06-18

## 2020-06-18 RX ORDER — CHLORPROMAZINE HCL 10 MG
50 TABLET ORAL ONCE
Refills: 0 | Status: COMPLETED | OUTPATIENT
Start: 2020-06-18 | End: 2020-06-18

## 2020-06-18 RX ORDER — CHLORPROMAZINE HCL 10 MG
50 TABLET ORAL EVERY 6 HOURS
Refills: 0 | Status: DISCONTINUED | OUTPATIENT
Start: 2020-06-18 | End: 2020-06-22

## 2020-06-18 RX ORDER — MIDAZOLAM HYDROCHLORIDE 1 MG/ML
10 INJECTION, SOLUTION INTRAMUSCULAR; INTRAVENOUS ONCE
Refills: 0 | Status: DISCONTINUED | OUTPATIENT
Start: 2020-06-18 | End: 2020-06-18

## 2020-06-18 RX ORDER — DIPHENHYDRAMINE HCL 50 MG
50 CAPSULE ORAL EVERY 6 HOURS
Refills: 0 | Status: DISCONTINUED | OUTPATIENT
Start: 2020-06-18 | End: 2020-06-22

## 2020-06-18 RX ORDER — CHLORPROMAZINE HCL 10 MG
100 TABLET ORAL EVERY 6 HOURS
Refills: 0 | Status: DISCONTINUED | OUTPATIENT
Start: 2020-06-18 | End: 2020-06-22

## 2020-06-18 RX ADMIN — Medication 50 MILLIGRAM(S): at 20:51

## 2020-06-18 RX ADMIN — MIDAZOLAM HYDROCHLORIDE 10 MILLIGRAM(S): 1 INJECTION, SOLUTION INTRAMUSCULAR; INTRAVENOUS at 23:07

## 2020-06-18 RX ADMIN — Medication 50 MILLIGRAM(S): at 15:00

## 2020-06-18 RX ADMIN — TETANUS TOXOID, REDUCED DIPHTHERIA TOXOID AND ACELLULAR PERTUSSIS VACCINE, ADSORBED 0.5 MILLILITER(S): 5; 2.5; 8; 8; 2.5 SUSPENSION INTRAMUSCULAR at 00:23

## 2020-06-18 RX ADMIN — OLANZAPINE 10 MILLIGRAM(S): 15 TABLET, FILM COATED ORAL at 13:16

## 2020-06-18 RX ADMIN — Medication 50 MILLIGRAM(S): at 20:50

## 2020-06-18 RX ADMIN — MIDAZOLAM HYDROCHLORIDE 10 MILLIGRAM(S): 1 INJECTION, SOLUTION INTRAMUSCULAR; INTRAVENOUS at 13:59

## 2020-06-18 RX ADMIN — MIDAZOLAM HYDROCHLORIDE 10 MILLIGRAM(S): 1 INJECTION, SOLUTION INTRAMUSCULAR; INTRAVENOUS at 07:30

## 2020-06-18 NOTE — ED ADULT NURSE REASSESSMENT NOTE - NS ED NURSE REASSESS COMMENT FT1
Pt presents to  with security, yelling, taking off gowns, and hostile. Pt with 1:1. Pt demanding food. will monitor the pt for safety. Pt presents to  with security, yelling, taking off gowns, and hostile. Pt with superficial lacerations on  left forearms. Pt with 1:1. Pt demanding food.  will monitor the pt for safety.

## 2020-06-18 NOTE — ED ADULT NURSE REASSESSMENT NOTE - NS ED NURSE REASSESS COMMENT FT1
pt is loud banging on window at [employer] station pulling up gown saying up want some of this my pussy come get it. banging on window. order received from [place]. pt continues to ask for food drinks cookies snacks pt urinating of luis cyelling out. security called pt informed of medication to be given im  pt stating I want It in my booty.  tell security to turn around then picking on  to go int o her

## 2020-06-18 NOTE — ED BEHAVIORAL HEALTH ASSESSMENT NOTE - SUMMARY
41 yo AA female, undomicled, originally from [place], mother lives in area, PPH Schizoaffective disorder, crack cocaine dependence, not in treatment, last evaluated in ED on 6/15 for agitation following admission for self inflicted cutting to wrist, no known h/o suicide attempt, h/o labile aggressive behavior, known to ED by name of Yakelin snow [birth year], past psych admissions, unk last, PMH NIDDM, HTN, bib [facility] for reports of bizarre behavior running in street naked.  Pt required Versed for agitation last evening and again after eval for lability and agitation.  This is second ED eval in past week, her behavior is high risk for self harm and to others based  impulsivity, lability, poor judgement, placing items in vagina, robert self inflicted laceration to forearm requiring sutures.  Pt require involuntary psych admission to psych facility when covid results and bed is available.

## 2020-06-18 NOTE — ED ADULT NURSE REASSESSMENT NOTE - NS ED NURSE REASSESS COMMENT FT1
pt continues to walk around the unit naked, screaming and threatening staff. Pt unable to be redirected. Pt accepted medication. Versed 10mg IM given to pt. security by pt's bedside. 1:1 by pt's bedside. No harm to self or others. Safety maintained.
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
## 2020-06-18 NOTE — ED BEHAVIORAL HEALTH ASSESSMENT NOTE - AXIS IV
Problems with interaction with legal system/Economic problems/Problems with access to healthcare services/Housing problems
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
## 2020-06-18 NOTE — ED ADULT NURSE REASSESSMENT NOTE - NS ED NURSE REASSESS COMMENT FT1
pt continues to behave inappropriately walked undressed from shower to room.  remains in room undressed with disposable underwear on head.  pt requesting to speak with this rn informed will not speak with her until she is in a gown.  pt yelling squirting hand lotion at this rn landing on floor.  attempting to squirt at this rn.

## 2020-06-18 NOTE — ED ADULT NURSE REASSESSMENT NOTE - NS ED NURSE REASSESS COMMENT FT1
pt loud rambling talking quickly diff to speak.  cursing at staff.  demanding food and cookies with ginerale spoke with pt informed not to demand to ask pt then spoke slower and asking please.  tray provided pt now demanding security to leave the unit or wait in her bed.  pt was told security willl stay in unit informed she doesn't make demands here. pt then staying you can take your dinner inform pt I don't want it.  pt dropped tray on floor.  this rn walking away.   pt talking with 1:1 na. picked up dinner tray pt eating talking now demanding 2 packs of oreo cookies juice ginerale.  pt given cookies doesn't care about hx dm.  given diet ginerale. pt continue to talk and make demands pt is loud disruptive, using inappropriate language.

## 2020-06-18 NOTE — ED ADULT NURSE REASSESSMENT NOTE - NS ED NURSE REASSESS COMMENT FT1
Patient rec'd at 0700 change of shift. Speech is loud and hyperverbal. Difficult to redirect when she is demanding and irritable, security present and pt on 1:1 for inappropriate lewd behavior. Pt requested to take a shower, removed her gowns and exposed herself to staff. Multiple attempts to redirect pt and set limits with her behaviors unsuccessful; pt continued to yell, curse and expose self. Dr. Johnson notified of pt behavior, and ordered IM Versed 10mg. Security present, but pt complied with IM medication administration @ 0730. Took shower, has been more calm so far.

## 2020-06-18 NOTE — ED ADULT NURSE REASSESSMENT NOTE - NS ED NURSE REASSESS COMMENT FT1
continues to yell bang on window wants things her way. yelling screaming.  earlier requested metformin spoke with dr pack.  orders received.  pt now refusing metformin wants seroquel wants 2 pkg of oreo cookies wants milk .  now taking gown off.  pt inform no more cookies pt yelling screaming

## 2020-06-18 NOTE — ED ADULT NURSE REASSESSMENT NOTE - NS ED NURSE REASSESS COMMENT FT1
received report.  pt sitting at day table eating meal tray demanding cereal cookies ginerale.  pt is constantly demanding nasty at times loud irritable.  language is foul nasty vulgar. rapid speech.  talking over staff continuously.  1:1 observation remains in effect

## 2020-06-18 NOTE — ED BEHAVIORAL HEALTH ASSESSMENT NOTE - VIOLENCE RISK FACTORS:
Antisocial behavior/cognition (past or present)/Irritability/Violent ideation/threat/speech/Lack of insight into violence risk/need for treatment/Noncompliance with treatment/Substance abuse/Affective dysregulation/Impulsivity
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
## 2020-06-18 NOTE — ED ADULT NURSE REASSESSMENT NOTE - NS ED NURSE REASSESS COMMENT FT1
pt not asking to talk to this rn wanting ca to leave pt informed ca stays pt wanting this rn to standing in front of her.  told no.  pt now sitting lifting up gown playing with  self.  this rn walked away pt getting up walking toward me.  in my face .   then in ca staffs face.

## 2020-06-19 LAB
AMPHET UR-MCNC: NEGATIVE — SIGNIFICANT CHANGE UP
APPEARANCE UR: CLEAR — SIGNIFICANT CHANGE UP
BARBITURATES UR SCN-MCNC: NEGATIVE — SIGNIFICANT CHANGE UP
BENZODIAZ UR-MCNC: POSITIVE
BILIRUB UR-MCNC: NEGATIVE — SIGNIFICANT CHANGE UP
COCAINE METAB.OTHER UR-MCNC: POSITIVE
COLOR SPEC: YELLOW — SIGNIFICANT CHANGE UP
DIFF PNL FLD: NEGATIVE — SIGNIFICANT CHANGE UP
GLUCOSE UR QL: NEGATIVE MG/DL — SIGNIFICANT CHANGE UP
KETONES UR-MCNC: NEGATIVE — SIGNIFICANT CHANGE UP
LEUKOCYTE ESTERASE UR-ACNC: NEGATIVE — SIGNIFICANT CHANGE UP
METHADONE UR-MCNC: NEGATIVE — SIGNIFICANT CHANGE UP
NITRITE UR-MCNC: NEGATIVE — SIGNIFICANT CHANGE UP
OPIATES UR-MCNC: NEGATIVE — SIGNIFICANT CHANGE UP
PCP SPEC-MCNC: SIGNIFICANT CHANGE UP
PCP UR-MCNC: NEGATIVE — SIGNIFICANT CHANGE UP
PH UR: 5 — SIGNIFICANT CHANGE UP (ref 5–8)
PROT UR-MCNC: NEGATIVE MG/DL — SIGNIFICANT CHANGE UP
SARS-COV-2 RNA SPEC QL NAA+PROBE: SIGNIFICANT CHANGE UP
SP GR SPEC: 1.02 — SIGNIFICANT CHANGE UP (ref 1.01–1.02)
THC UR QL: NEGATIVE — SIGNIFICANT CHANGE UP
UROBILINOGEN FLD QL: NEGATIVE MG/DL — SIGNIFICANT CHANGE UP

## 2020-06-19 PROCEDURE — 99213 OFFICE O/P EST LOW 20 MIN: CPT

## 2020-06-19 PROCEDURE — 93010 ELECTROCARDIOGRAM REPORT: CPT

## 2020-06-19 RX ORDER — ACETAMINOPHEN 500 MG
650 TABLET ORAL ONCE
Refills: 0 | Status: COMPLETED | OUTPATIENT
Start: 2020-06-19 | End: 2020-06-19

## 2020-06-19 RX ORDER — METFORMIN HYDROCHLORIDE 850 MG/1
500 TABLET ORAL
Refills: 0 | Status: DISCONTINUED | OUTPATIENT
Start: 2020-06-19 | End: 2020-06-22

## 2020-06-19 RX ORDER — MIDAZOLAM HYDROCHLORIDE 1 MG/ML
10 INJECTION, SOLUTION INTRAMUSCULAR; INTRAVENOUS ONCE
Refills: 0 | Status: DISCONTINUED | OUTPATIENT
Start: 2020-06-19 | End: 2020-06-19

## 2020-06-19 RX ADMIN — Medication 50 MILLIGRAM(S): at 09:00

## 2020-06-19 RX ADMIN — Medication 50 MILLIGRAM(S): at 21:45

## 2020-06-19 RX ADMIN — METFORMIN HYDROCHLORIDE 500 MILLIGRAM(S): 850 TABLET ORAL at 16:38

## 2020-06-19 RX ADMIN — Medication 2 MILLIGRAM(S): at 21:45

## 2020-06-19 RX ADMIN — Medication 2 MILLIGRAM(S): at 09:00

## 2020-06-19 RX ADMIN — Medication 650 MILLIGRAM(S): at 21:15

## 2020-06-19 RX ADMIN — Medication 2 MILLIGRAM(S): at 11:27

## 2020-06-19 RX ADMIN — MIDAZOLAM HYDROCHLORIDE 10 MILLIGRAM(S): 1 INJECTION, SOLUTION INTRAMUSCULAR; INTRAVENOUS at 07:33

## 2020-06-19 NOTE — ED ADULT NURSE REASSESSMENT NOTE - NS ED NURSE REASSESS COMMENT FT1
patient banging on windows screaming and defecated  and urinated on floor.  enviromental called for clean up and patient medciated with prn medication. patient remains agitated and awaiting on effect of medication
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
## 2020-06-19 NOTE — ED ADULT NURSE REASSESSMENT NOTE - NS ED NURSE REASSESS COMMENT FT1
Patient began banging on window and at nurse station and yelling.  Patient approached by staff and began threatening staff threatening to spit on them and using profanity and racial slurs.  Patient attempted to barricade her self in empty patient room.  Security called to  area.  Patient informed medication would be administered and patient proceeded to bend over yelling give me to shots.  Patient received Thorazine 50mg IM, Benadryl 50mg IM , and Ativan 2mg IM at 0900 in right gluteal  muscle as directed by Dr. Acosta for agitation and threatening behavior.  Patient remains agitated and banging her on windows.  Patient provided food as she demanded educated about constant carb diet but yelled "I don't care". Patient began banging on window and at nurse station and yelling.  Patient approached by staff and began threatening staff threatening to spit on them and using profanity and racial slurs.  Patient attempted to barricade her self in empty patient room.  Security called to  area.  Patient informed medication would be administered and patient proceeded to bend over yelling give me to shots.  Patient received Thorazine 50mg IM, Benadryl 50mg IM , and Ativan 2mg IM at 0900 in right gluteal  muscle as directed by Dr. Acosta for agitation and threatening behavior.  Patient remains agitated and banging her hands on windows.  Patient provided food as she demanded educated about constant carb diet but yelled "I don't care".

## 2020-06-19 NOTE — ED ADULT NURSE REASSESSMENT NOTE - NS ED NURSE REASSESS COMMENT FT1
Patient requested and received Ativan 2mg PO at 1127 for feeling agitated.  No attempts to harm self or others and safety maintained.  Patient ate 100% of her meal.

## 2020-06-19 NOTE — ED ADULT NURSE REASSESSMENT NOTE - NS ED NURSE REASSESS COMMENT FT1
Patient currently resting in bed asleep with no distress noted.  Safety of patient maintained.  Constant observation in place.

## 2020-06-19 NOTE — ED ADULT NURSE REASSESSMENT NOTE - NS ED NURSE REASSESS COMMENT FT1
Patient has had no further episodes of vomiting and tolerated food items post emesis.  Patient behavior remains labile.  Patient has better response to verbal interventions.  Patient slept intermittently in her room.

## 2020-06-19 NOTE — ED ADULT NURSE REASSESSMENT NOTE - NS ED NURSE REASSESS COMMENT FT1
Patient is non compliant with her diet and demands food frequently.  Patient mood remains labile and became easily agitated and irritable with limited response to verbal interventions.  Patient encouraged to maintain appropriate behavior and is verbally hostile towards staff on constant observation.  Refused medication interventions offered.

## 2020-06-19 NOTE — CHART NOTE - NSCHARTNOTEFT_GEN_A_CORE
RAYMON Note: Plan is to transfer pt for inpt psychiatric care. Covid negative. name is : Yakelin Huff  1980, uninsured and homeless. Transfer options limited due to no insurance. Called [facility], [phone]. Referral made but bed census pending. SW to follow

## 2020-06-19 NOTE — ED ADULT NURSE REASSESSMENT NOTE - NS ED NURSE REASSESS COMMENT FT1
Assumed care of pt @1930. Received pt resting/sleeping comfortably in bed, in no apparent distress. Respirations even & unlabored. 1:1 by pt's bedside. Will continue to monitor the pt for safety.
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
## 2020-06-19 NOTE — ED ADULT NURSE REASSESSMENT NOTE - NS ED NURSE REASSESS COMMENT FT1
patient presently in bed.  patient remains on 1:1  will monitor and chart changes while maintaining safe environment
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
## 2020-06-19 NOTE — ED ADULT NURSE REASSESSMENT NOTE - NS ED NURSE REASSESS COMMENT FT1
Patient ate 100% of her dinner and demanding snacks.  After meal patient had episode of vomiting while in bed consisting of food items.  Patient provided supplies to shower post emesis.  No attempts to harm self or others and safety maintained.

## 2020-06-19 NOTE — ED ADULT NURSE REASSESSMENT NOTE - NS ED NURSE REASSESS COMMENT FT1
patient awake ambulating in hallway requesting tv remote. patient aggitated and pacing. patient wanting staff to come into day room patient banding on windows and requesting phone use. patient given phone.  will monitor and changes

## 2020-06-20 ENCOUNTER — INPATIENT (INPATIENT)
Facility: HOSPITAL | Age: 40
LOS: 8 days | Discharge: HOME | DRG: 750 | End: 2020-06-29
Attending: PSYCHIATRY & NEUROLOGY | Admitting: PSYCHIATRY & NEUROLOGY
Payer: MEDICAID

## 2020-06-20 VITALS
SYSTOLIC BLOOD PRESSURE: 111 MMHG | DIASTOLIC BLOOD PRESSURE: 62 MMHG | RESPIRATION RATE: 20 BRPM | OXYGEN SATURATION: 96 % | HEART RATE: 84 BPM | TEMPERATURE: 98 F

## 2020-06-20 DIAGNOSIS — R41.82 ALTERED MENTAL STATUS, UNSPECIFIED: ICD-10-CM

## 2020-06-20 PROCEDURE — 12001 RPR S/N/AX/GEN/TRNK 2.5CM/<: CPT

## 2020-06-20 PROCEDURE — U0003: CPT

## 2020-06-20 PROCEDURE — 82962 GLUCOSE BLOOD TEST: CPT

## 2020-06-20 PROCEDURE — 72170 X-RAY EXAM OF PELVIS: CPT

## 2020-06-20 PROCEDURE — 36415 COLL VENOUS BLD VENIPUNCTURE: CPT

## 2020-06-20 PROCEDURE — 87389 HIV-1 AG W/HIV-1&-2 AB AG IA: CPT

## 2020-06-20 PROCEDURE — 81001 URINALYSIS AUTO W/SCOPE: CPT

## 2020-06-20 PROCEDURE — 99222 1ST HOSP IP/OBS MODERATE 55: CPT

## 2020-06-20 PROCEDURE — 99285 EMERGENCY DEPT VISIT HI MDM: CPT | Mod: 25

## 2020-06-20 PROCEDURE — 90471 IMMUNIZATION ADMIN: CPT

## 2020-06-20 PROCEDURE — 90715 TDAP VACCINE 7 YRS/> IM: CPT

## 2020-06-20 PROCEDURE — 94640 AIRWAY INHALATION TREATMENT: CPT

## 2020-06-20 PROCEDURE — 80053 COMPREHEN METABOLIC PANEL: CPT

## 2020-06-20 PROCEDURE — 80307 DRUG TEST PRSMV CHEM ANLYZR: CPT

## 2020-06-20 PROCEDURE — 84702 CHORIONIC GONADOTROPIN TEST: CPT

## 2020-06-20 PROCEDURE — 81003 URINALYSIS AUTO W/O SCOPE: CPT

## 2020-06-20 PROCEDURE — 96372 THER/PROPH/DIAG INJ SC/IM: CPT | Mod: XU

## 2020-06-20 PROCEDURE — 71045 X-RAY EXAM CHEST 1 VIEW: CPT

## 2020-06-20 PROCEDURE — 93005 ELECTROCARDIOGRAM TRACING: CPT

## 2020-06-20 PROCEDURE — 85027 COMPLETE CBC AUTOMATED: CPT

## 2020-06-20 RX ORDER — CHLORPROMAZINE HCL 10 MG
50 TABLET ORAL EVERY 8 HOURS
Refills: 0 | Status: DISCONTINUED | OUTPATIENT
Start: 2020-06-20 | End: 2020-06-29

## 2020-06-20 RX ORDER — INSULIN LISPRO 100/ML
VIAL (ML) SUBCUTANEOUS
Refills: 0 | Status: DISCONTINUED | OUTPATIENT
Start: 2020-06-20 | End: 2020-06-29

## 2020-06-20 RX ORDER — METFORMIN HYDROCHLORIDE 850 MG/1
500 TABLET ORAL
Refills: 0 | Status: DISCONTINUED | OUTPATIENT
Start: 2020-06-20 | End: 2020-06-29

## 2020-06-20 RX ORDER — DIPHENHYDRAMINE HCL 50 MG
50 CAPSULE ORAL ONCE
Refills: 0 | Status: COMPLETED | OUTPATIENT
Start: 2020-06-20 | End: 2020-06-20

## 2020-06-20 RX ORDER — GLUCAGON INJECTION, SOLUTION 0.5 MG/.1ML
1 INJECTION, SOLUTION SUBCUTANEOUS ONCE
Refills: 0 | Status: DISCONTINUED | OUTPATIENT
Start: 2020-06-20 | End: 2020-06-29

## 2020-06-20 RX ORDER — DEXTROSE 50 % IN WATER 50 %
25 SYRINGE (ML) INTRAVENOUS ONCE
Refills: 0 | Status: DISCONTINUED | OUTPATIENT
Start: 2020-06-20 | End: 2020-06-29

## 2020-06-20 RX ORDER — DIPHENHYDRAMINE HCL 50 MG
50 CAPSULE ORAL EVERY 8 HOURS
Refills: 0 | Status: DISCONTINUED | OUTPATIENT
Start: 2020-06-20 | End: 2020-06-29

## 2020-06-20 RX ORDER — CHLORPROMAZINE HCL 10 MG
100 TABLET ORAL EVERY 6 HOURS
Refills: 0 | Status: DISCONTINUED | OUTPATIENT
Start: 2020-06-20 | End: 2020-06-29

## 2020-06-20 RX ORDER — DEXTROSE 50 % IN WATER 50 %
12.5 SYRINGE (ML) INTRAVENOUS ONCE
Refills: 0 | Status: DISCONTINUED | OUTPATIENT
Start: 2020-06-20 | End: 2020-06-29

## 2020-06-20 RX ORDER — DEXTROSE 50 % IN WATER 50 %
15 SYRINGE (ML) INTRAVENOUS ONCE
Refills: 0 | Status: DISCONTINUED | OUTPATIENT
Start: 2020-06-20 | End: 2020-06-29

## 2020-06-20 RX ORDER — HALOPERIDOL DECANOATE 100 MG/ML
5 INJECTION INTRAMUSCULAR ONCE
Refills: 0 | Status: COMPLETED | OUTPATIENT
Start: 2020-06-20 | End: 2020-06-20

## 2020-06-20 RX ORDER — SODIUM CHLORIDE 9 MG/ML
1000 INJECTION, SOLUTION INTRAVENOUS
Refills: 0 | Status: DISCONTINUED | OUTPATIENT
Start: 2020-06-20 | End: 2020-06-29

## 2020-06-20 RX ADMIN — Medication 3: at 17:58

## 2020-06-20 RX ADMIN — Medication 2 MILLIGRAM(S): at 10:34

## 2020-06-20 RX ADMIN — Medication 100 MILLIGRAM(S): at 09:50

## 2020-06-20 RX ADMIN — Medication 2 MILLIGRAM(S): at 14:32

## 2020-06-20 RX ADMIN — HALOPERIDOL DECANOATE 5 MILLIGRAM(S): 100 INJECTION INTRAMUSCULAR at 14:10

## 2020-06-20 RX ADMIN — METFORMIN HYDROCHLORIDE 500 MILLIGRAM(S): 850 TABLET ORAL at 06:08

## 2020-06-20 RX ADMIN — Medication 50 MILLIGRAM(S): at 14:21

## 2020-06-20 RX ADMIN — Medication 50 MILLIGRAM(S): at 07:50

## 2020-06-20 RX ADMIN — Medication 2 MILLIGRAM(S): at 07:50

## 2020-06-20 RX ADMIN — Medication 2 MILLIGRAM(S): at 12:59

## 2020-06-20 NOTE — ED ADULT NURSE REASSESSMENT NOTE - STATUS
awaiting consult
awaiting transfer, no change
awaiting consult
awaiting transfer, no change
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
## 2020-06-20 NOTE — CHART NOTE - NSCHARTNOTEFT_GEN_A_CORE
As per Dr. Lara, patient is medically stable for discharge. As per Dr. Acosta, patient is to attend inpatient psychiatry facility upon discharge. Dr. Acosta completed 9.37 legals on behalf of patient. RAYMON spoke with Dr. Judd from [facility] ([phone]) whom reported the patient is accepted to [facility] today. RAYMON made Dr. Judd aware patient is covid negative. Sending MD is Dr. Lara, and accepting MD is Dr. Judd. Patient does not require authorization, as patient is uninsured. Dr. Lara completed certificate of transfer form on behalf of patient. RAYMON placed call to [facility] to arrange patient's transportation. [facility] employee, Roro, arranged patient's transportation to [facility]. MONIKA Monzon provided clinical on behalf of patient.  transfer packet completed with 9.37 legals, certificate of transfer form,  assessment, negative covid results, and patient's facesheet.
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
## 2020-06-20 NOTE — ED ADULT NURSE REASSESSMENT NOTE - NS ED NURSE REASSESS COMMENT FT1
pt being transferred to [facility], 4 point restraints required for pt and staff safety, pt was able to get out of restraints in the ambulance which returned to  to secure pt. pt irritable and agitated threatening staff, security called and order for ativan obtained from the ed attending.

## 2020-06-20 NOTE — ED ADULT NURSE REASSESSMENT NOTE - NS ED NURSE REASSESS COMMENT FT1
assumed care of patient, security noted to be in , pt with manic mood hyperverbal, loud using profanities. redirection attempted with little success. pt with multiple requests for specific foods, remotes, telephone, sundries. pt with 1:1 for safety. pt with labile mood swings becoming agitated and combative ,pt's food requests relayed to nutrition. pt ruminating over food,  states that she has an allergy to eggs. will medicate pt and re-assess.

## 2020-06-20 NOTE — ED ADULT NURSE REASSESSMENT NOTE - GENERAL PATIENT STATE
comfortable appearance/cooperative
comfortable appearance/cooperative/resting/sleeping
resting/sleeping
anxious
anxious
irritable/drowsy
resting/sleeping/comfortable appearance
resting/sleeping/comfortable appearance
resting/sleeping/cooperative/comfortable appearance
anxious
comfortable appearance/resting/sleeping
resting/sleeping/comfortable appearance

## 2020-06-20 NOTE — PATIENT PROFILE BEHAVIORAL HEALTH - NSBHBEHAVIOR_PSY_A_CORE
inappropriate for situation/attention seeking/demanding/uncooperative/agitated/angry/restless/irrational/no boundaries/non-compliant with medications

## 2020-06-20 NOTE — ED ADULT NURSE REASSESSMENT NOTE - NS ED NURSE REASSESS COMMENT FT1
patient out of bed ambulating with 1:1 at arm length.  patient loud and difficult to redirect.  will monitor patient and maintain safe environment

## 2020-06-20 NOTE — ED ADULT NURSE REASSESSMENT NOTE - COMFORT CARE
darkened lights
po fluids offered/warm blanket provided/repositioned/meal provided
taking shower/plan of care explained
warm blanket provided/plan of care explained/wait time explained/po fluids offered
ambulated to bathroom/plan of care explained
meal provided
meal provided/plan of care explained
plan of care explained
plan of care explained/po fluids offered
plan of care explained/po fluids offered
po fluids offered/darkened lights/meal provided
po fluids offered/plan of care explained
darkened lights
meal provided/plan of care explained

## 2020-06-20 NOTE — PATIENT PROFILE BEHAVIORAL HEALTH - NSBHPSYTREAT_PSY_A_CORE
family history of psych problems/hx mood d/0, cocaine abuse with mmany psych admits,well know to [facility]

## 2020-06-20 NOTE — CHART NOTE - NSCHARTNOTEFT_GEN_A_CORE
As per MD, patient is medically stable for discharge. As per Dr. Acosta, patient is to attend inpatient psychiatry facility on 9.37 status. Patient is uninsured and is only able to attend [facility] or [facility] upon discharge. RAYMON placed call to [facility] ([phone]) and spoke with Assistance Director of Nursing, Alexsandra. Alexsandra reported there are no beds available. RAYMON then placed call to [facility] and spoke with employee, Ronda, whom reported there is one female bed available and requested SW fax BH assessment, patient's faceshseet, and 9.37 legals to Fax: [phone]. RAYMON completed fax. RAYMON continues to follow for placement.

## 2020-06-20 NOTE — PATIENT PROFILE BEHAVIORAL HEALTH - NSCAGESTDRUGGUILT_GEN_A_CORE_SD
Shagufta also sent a refill request of:    Flovent HFA 110mcg  
Patient information on fall and injury prevention
no

## 2020-06-21 VITALS — WEIGHT: 233.91 LBS | TEMPERATURE: 98 F | OXYGEN SATURATION: 100 %

## 2020-06-21 PROCEDURE — 99231 SBSQ HOSP IP/OBS SF/LOW 25: CPT

## 2020-06-21 RX ORDER — CHLORPROMAZINE HCL 10 MG
50 TABLET ORAL
Refills: 0 | Status: DISCONTINUED | OUTPATIENT
Start: 2020-06-21 | End: 2020-06-22

## 2020-06-21 RX ADMIN — Medication 100 MILLIGRAM(S): at 09:17

## 2020-06-21 RX ADMIN — Medication 2 MILLIGRAM(S): at 09:17

## 2020-06-21 RX ADMIN — Medication 100 MILLIGRAM(S): at 19:22

## 2020-06-21 RX ADMIN — Medication 50 MILLIGRAM(S): at 22:48

## 2020-06-21 RX ADMIN — Medication 100 MILLIGRAM(S): at 01:50

## 2020-06-21 RX ADMIN — Medication 4: at 12:23

## 2020-06-21 RX ADMIN — Medication 2 MILLIGRAM(S): at 01:50

## 2020-06-21 RX ADMIN — Medication 2 MILLIGRAM(S): at 14:36

## 2020-06-21 RX ADMIN — METFORMIN HYDROCHLORIDE 500 MILLIGRAM(S): 850 TABLET ORAL at 21:27

## 2020-06-21 RX ADMIN — Medication 50 MILLIGRAM(S): at 06:09

## 2020-06-21 RX ADMIN — Medication 50 MILLIGRAM(S): at 14:20

## 2020-06-21 RX ADMIN — METFORMIN HYDROCHLORIDE 500 MILLIGRAM(S): 850 TABLET ORAL at 09:17

## 2020-06-21 RX ADMIN — Medication 0: at 06:00

## 2020-06-21 RX ADMIN — Medication 50 MILLIGRAM(S): at 21:55

## 2020-06-21 RX ADMIN — Medication 2 MILLIGRAM(S): at 23:20

## 2020-06-21 RX ADMIN — Medication 50 MILLIGRAM(S): at 21:27

## 2020-06-21 RX ADMIN — Medication 50 MILLIGRAM(S): at 13:44

## 2020-06-21 RX ADMIN — Medication 2 MILLIGRAM(S): at 16:00
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
## 2020-06-22 PROCEDURE — 99231 SBSQ HOSP IP/OBS SF/LOW 25: CPT

## 2020-06-22 RX ORDER — CHLORPROMAZINE HCL 10 MG
50 TABLET ORAL THREE TIMES A DAY
Refills: 0 | Status: DISCONTINUED | OUTPATIENT
Start: 2020-06-22 | End: 2020-06-23

## 2020-06-22 RX ORDER — QUETIAPINE FUMARATE 200 MG/1
50 TABLET, FILM COATED ORAL AT BEDTIME
Refills: 0 | Status: DISCONTINUED | OUTPATIENT
Start: 2020-06-22 | End: 2020-06-23

## 2020-06-22 RX ORDER — ALBUTEROL 90 UG/1
2 AEROSOL, METERED ORAL EVERY 6 HOURS
Refills: 0 | Status: DISCONTINUED | OUTPATIENT
Start: 2020-06-22 | End: 2020-06-29

## 2020-06-22 RX ADMIN — Medication 100 MILLIGRAM(S): at 01:30

## 2020-06-22 RX ADMIN — QUETIAPINE FUMARATE 50 MILLIGRAM(S): 200 TABLET, FILM COATED ORAL at 20:04

## 2020-06-22 RX ADMIN — METFORMIN HYDROCHLORIDE 500 MILLIGRAM(S): 850 TABLET ORAL at 09:39

## 2020-06-22 RX ADMIN — Medication 50 MILLIGRAM(S): at 09:39

## 2020-06-22 RX ADMIN — METFORMIN HYDROCHLORIDE 500 MILLIGRAM(S): 850 TABLET ORAL at 20:07

## 2020-06-22 RX ADMIN — Medication 2 MILLIGRAM(S): at 15:30

## 2020-06-22 RX ADMIN — Medication 100 MILLIGRAM(S): at 15:29

## 2020-06-22 RX ADMIN — Medication 2 MILLIGRAM(S): at 08:04

## 2020-06-22 RX ADMIN — Medication 50 MILLIGRAM(S): at 20:04

## 2020-06-22 NOTE — PROGRESS NOTE BEHAVIORAL HEALTH - NSBHFUPINTERVALHXFT_PSY_A_CORE
06/22/20: Patient was seen today AM, less agitated. She was walking naked in the unit so she was medicated with Multiple PRN IM Meds for stability and safety. Later she was in the seclusion room for hours. Today till now in control, and was advised to help herself to stay in control so she can be treated well before discharge. She added that she would take Seroquel in AM/PM and thus Seroquel was ordered for HS only and Thorazine increased to Thorazine 50 mg TID.

## 2020-06-23 DIAGNOSIS — F25.9 SCHIZOAFFECTIVE DISORDER, UNSPECIFIED: ICD-10-CM

## 2020-06-23 DIAGNOSIS — F25.0 SCHIZOAFFECTIVE DISORDER, BIPOLAR TYPE: ICD-10-CM

## 2020-06-23 DIAGNOSIS — F20.9 SCHIZOPHRENIA, UNSPECIFIED: ICD-10-CM

## 2020-06-23 DIAGNOSIS — E11.65 TYPE 2 DIABETES MELLITUS WITH HYPERGLYCEMIA: ICD-10-CM

## 2020-06-23 DIAGNOSIS — R91.8 OTHER NONSPECIFIC ABNORMAL FINDING OF LUNG FIELD: ICD-10-CM

## 2020-06-23 DIAGNOSIS — I10 ESSENTIAL (PRIMARY) HYPERTENSION: ICD-10-CM

## 2020-06-23 LAB
APPEARANCE UR: CLEAR — SIGNIFICANT CHANGE UP
BILIRUB UR-MCNC: NEGATIVE — SIGNIFICANT CHANGE UP
COLOR SPEC: YELLOW — SIGNIFICANT CHANGE UP
DIFF PNL FLD: NEGATIVE — SIGNIFICANT CHANGE UP
GLUCOSE UR QL: NEGATIVE MG/DL — SIGNIFICANT CHANGE UP
KETONES UR-MCNC: NEGATIVE — SIGNIFICANT CHANGE UP
LEUKOCYTE ESTERASE UR-ACNC: ABNORMAL
NITRITE UR-MCNC: NEGATIVE — SIGNIFICANT CHANGE UP
PH UR: 7 — SIGNIFICANT CHANGE UP (ref 5–8)
PROT UR-MCNC: NEGATIVE MG/DL — SIGNIFICANT CHANGE UP
SP GR SPEC: 1.01 — SIGNIFICANT CHANGE UP (ref 1.01–1.02)
UROBILINOGEN FLD QL: NEGATIVE MG/DL — SIGNIFICANT CHANGE UP

## 2020-06-23 PROCEDURE — 99254 IP/OBS CNSLTJ NEW/EST MOD 60: CPT | Mod: GC

## 2020-06-23 PROCEDURE — 99231 SBSQ HOSP IP/OBS SF/LOW 25: CPT

## 2020-06-23 RX ORDER — ACETAMINOPHEN 500 MG
650 TABLET ORAL EVERY 6 HOURS
Refills: 0 | Status: DISCONTINUED | OUTPATIENT
Start: 2020-06-23 | End: 2020-06-29

## 2020-06-23 RX ORDER — QUETIAPINE FUMARATE 200 MG/1
100 TABLET, FILM COATED ORAL ONCE
Refills: 0 | Status: COMPLETED | OUTPATIENT
Start: 2020-06-23 | End: 2020-06-23

## 2020-06-23 RX ORDER — NICOTINE POLACRILEX 2 MG
1 GUM BUCCAL DAILY
Refills: 0 | Status: DISCONTINUED | OUTPATIENT
Start: 2020-06-23 | End: 2020-06-29

## 2020-06-23 RX ORDER — QUETIAPINE FUMARATE 200 MG/1
100 TABLET, FILM COATED ORAL AT BEDTIME
Refills: 0 | Status: DISCONTINUED | OUTPATIENT
Start: 2020-06-23 | End: 2020-06-24

## 2020-06-23 RX ORDER — NICOTINE POLACRILEX 2 MG
2 GUM BUCCAL
Refills: 0 | Status: DISCONTINUED | OUTPATIENT
Start: 2020-06-23 | End: 2020-06-29

## 2020-06-23 RX ORDER — CHLORPROMAZINE HCL 10 MG
100 TABLET ORAL THREE TIMES A DAY
Refills: 0 | Status: DISCONTINUED | OUTPATIENT
Start: 2020-06-23 | End: 2020-06-29

## 2020-06-23 RX ADMIN — Medication 2 MILLIGRAM(S): at 02:20

## 2020-06-23 RX ADMIN — METFORMIN HYDROCHLORIDE 500 MILLIGRAM(S): 850 TABLET ORAL at 09:14

## 2020-06-23 RX ADMIN — Medication 100 MILLIGRAM(S): at 09:14

## 2020-06-23 RX ADMIN — Medication 100 MILLIGRAM(S): at 16:42

## 2020-06-23 RX ADMIN — Medication 2 MILLIGRAM(S): at 09:14

## 2020-06-23 RX ADMIN — Medication 50 MILLIGRAM(S): at 10:29

## 2020-06-23 RX ADMIN — Medication 50 MILLIGRAM(S): at 10:30

## 2020-06-23 RX ADMIN — Medication 650 MILLIGRAM(S): at 17:04

## 2020-06-23 RX ADMIN — Medication 2 MILLIGRAM(S): at 16:42

## 2020-06-23 NOTE — CONSULT NOTE ADULT - PROBLEM SELECTOR RECOMMENDATION 4
continue Metformin 500mg Twice daily  Correctional  sliding scale  Hba1c  Urine fro microalbuminuria   diabetic diet continue Metformin 500mg Twice daily  Correctional  sliding scale  Hba1c  BMP   Urine fro microalbuminuria   diabetic diet

## 2020-06-23 NOTE — PROVIDER CONTACT NOTE (OTHER) - SITUATION
Spoke with Alesha and Dr. Justice's office, she will give him the consult. Spoke with Alesha at Dr. Justice's office, she will give him the consult.

## 2020-06-23 NOTE — CONSULT NOTE ADULT - SUBJECTIVE AND OBJECTIVE BOX
39 YO AA Female was transferred here from [facility] after admission for aggressive  and bizarre behavior at the [facility] o June 15th.  She has a long history of Psychiatric treatment for mood disorder and aggressive and abusive behavior. Has had multiple episodes of self mutilation with no know history of suicide attempt . At this time she has been medicated and is lethargic but responds to verbal arousal.  She denies any hallucinosis or suicidal or homicidal ideation at this time .She has no complaints of drug withdrawal symptoms at this time.    PMH    Hypertension              Diabetes Mellitus Type ll ( controlled with diet and Metformin )    PSH     one elective termination of pregnancy              no other operations             LMP 3 weeks ago    Allergies  Penicillin , Haldol   ( does not know reactions she may have had )    Family Hx   Mother lives in [place]                   single  no children                    Lives in [place]      Social    Smokes > 1ppd cigarettes               Occasional  drinker              History of polysubstance abuse including "Crack" and ( cocaine).                           ROS   patient is lethargic and response are at times intelligible and of questionable validity            In general the review is unremarkable.  She did state her LMP was three weeks ago.    Vital Signs Last 24 Hrs  T(C): 36.7 (22 Jun 2020 07:09), Max: 36.7 (22 Jun 2020 07:09)  T(F): 98.1 (22 Jun 2020 07:09), Max: 98.1 (22 Jun 2020 07:09)  RR: 14 (22 Jun 2020 07:09) (14 - 14)  SpO2: 100% (22 Jun 2020 07:09) (100% - 100%)        PE         patient is lethargic but oriented to place and person  Head            normocephalic  no head trauma  eyes              no jaundice PERRLA @ 4 mm   nose              midline  no trauma  Throat             clear   Lungs       no rales ,rubs or rhonchi   occasional crackles in bases which clear with cough 41 YO AA Female was transferred here from [facility] after admission for aggressive  and bizarre behavior at the [facility] o June 15th.  She has a long history of Psychiatric treatment for mood disorder and aggressive and abusive behavior. Has had multiple episodes of self mutilation with no know history of suicide attempt . At this time she has been medicated and is lethargic but responds to verbal arousal.  She denies any hallucinosis or suicidal or homicidal ideation at this time .She has no complaints of drug withdrawal symptoms at this time.    PMH    Hypertension              Diabetes Mellitus Type ll ( controlled with diet and Metformin )    PSH     one elective termination of pregnancy              no other operations             LMP 3 weeks ago    Allergies  Penicillin , Haldol   ( does not know reactions she may have had )    Family Hx   Mother lives in [place]                   single  no children                    Lives in [place]      Social    Smokes > 1ppd cigarettes              Occasional  drinker              History of polysubstance abuse including "Crack" and ( cocaine).                           ROS   patient is lethargic and response are at times intelligible and of questionable validity            In general the review is unremarkable.  She did state her LMP was three weeks ago.    Vital Signs Last 24 Hrs  T(C): 36.7 (22 Jun 2020 07:09), Max: 36.7 (22 Jun 2020 07:09)  T(F): 98.1 (22 Jun 2020 07:09), Max: 98.1 (22 Jun 2020 07:09)  RR: 14 (22 Jun 2020 07:09) (14 - 14)  SpO2: 100% (22 Jun 2020 07:09) (100% - 100%)        PE         patient is lethargic but oriented to place and person    Head             normocephalic  no head trauma  eyes              no jaundice, PERRLA @ 4 mm   nose              midline,  no trauma  Throat           clear   Lungs            no rales ,rubs or rhonchi  occasional crackles in bases which clear with cough   Heart            RR&R  no murmur or gallop.    Abdomen     obese , pendular , no palpable organomegaly   Ext              There are multiple  ( 7-8 ) linear laceration scars on the anterior aspect of the right forearm                     There are several ( 3-4 ) liner laceration scars of the anterior aspect of the left forearm                     Patient  admits that these were self inflicted   Neuro          patient is lethargic and unable to follow commands well                     Sensory is intact .  Patient moves all extremities with symmetrical strength and muscle mass 39 YO AA Female was transferred here from [facility] after admission for aggressive  and bizarre behavior at the [facility] o June 15th.  She has a long history of Psychiatric treatment for mood disorder and aggressive and abusive behavior. Has had multiple episodes of self mutilation with no know history of suicide attempt . At this time she has been medicated and is lethargic but responds to verbal arousal.  She denies any hallucinosis or suicidal or homicidal ideation at this time .She has no complaints of drug withdrawal symptoms at this time.    PMH    Hypertension  no medications              Diabetes Mellitus Type ll ( controlled with diet and Metformin )             Chart reveals patient is on albuterol inhaler    PSH     one elective termination of pregnancy              no other operations             LMP 3 weeks ago    Allergies  Penicillin , Haldol   ( does not know reactions she may have had )    Family Hx   Mother lives in [place]                   single  no children                    Lives in [place]      Social    Smokes > 1ppd cigarettes              Occasional  drinker              History of polysubstance abuse including "Crack" and ( cocaine).                           ROS   patient is lethargic and response are at times intelligible and of questionable validity            In general the review is unremarkable.  She did state her LMP was three weeks ago.    Vital Signs Last 24 Hrs  T(C): 36.7 (22 Jun 2020 07:09), Max: 36.7 (22 Jun 2020 07:09)  T(F): 98.1 (22 Jun 2020 07:09), Max: 98.1 (22 Jun 2020 07:09)  RR: 14 (22 Jun 2020 07:09) (14 - 14)  SpO2: 100% (22 Jun 2020 07:09) (100% - 100%)        PE         patient is lethargic but oriented to place and person    Head             normocephalic  no head trauma  eyes              no jaundice, PERRLA @ 4 mm   nose              midline,  no trauma  Throat           clear   Lungs            no rales ,rubs or rhonchi  occasional crackles in bases which clear with cough   Heart            RR&R  no murmur or gallop.    Abdomen     obese , pendular , no palpable organomegaly   Ext              There are multiple  ( 7-8 ) linear laceration scars on the anterior aspect of the right forearm                     There are several ( 3-4 ) liner laceration scars of the anterior aspect of the left forearm                     Patient  admits that these were self inflicted   Neuro          patient is lethargic and unable to follow commands well                     Sensory is intact .  Patient moves all extremities with symmetrical strength and muscle mass

## 2020-06-24 LAB
HIV 1+2 AB+HIV1 P24 AG SERPL QL IA: SIGNIFICANT CHANGE UP
SARS-COV-2 RNA SPEC QL NAA+PROBE: SIGNIFICANT CHANGE UP

## 2020-06-24 PROCEDURE — 71045 X-RAY EXAM CHEST 1 VIEW: CPT | Mod: 26

## 2020-06-24 PROCEDURE — 99231 SBSQ HOSP IP/OBS SF/LOW 25: CPT

## 2020-06-24 RX ORDER — HALOPERIDOL DECANOATE 100 MG/ML
5 INJECTION INTRAMUSCULAR ONCE
Refills: 0 | Status: COMPLETED | OUTPATIENT
Start: 2020-06-24 | End: 2020-06-24

## 2020-06-24 RX ORDER — QUETIAPINE FUMARATE 200 MG/1
200 TABLET, FILM COATED ORAL AT BEDTIME
Refills: 0 | Status: DISCONTINUED | OUTPATIENT
Start: 2020-06-24 | End: 2020-06-25

## 2020-06-24 RX ORDER — GABAPENTIN 400 MG/1
400 CAPSULE ORAL THREE TIMES A DAY
Refills: 0 | Status: DISCONTINUED | OUTPATIENT
Start: 2020-06-24 | End: 2020-06-25

## 2020-06-24 RX ORDER — DIPHENHYDRAMINE HCL 50 MG
50 CAPSULE ORAL ONCE
Refills: 0 | Status: COMPLETED | OUTPATIENT
Start: 2020-06-24 | End: 2020-06-26

## 2020-06-24 RX ADMIN — Medication 100 MILLIGRAM(S): at 20:04

## 2020-06-24 RX ADMIN — Medication 100 MILLIGRAM(S): at 09:07

## 2020-06-24 RX ADMIN — METFORMIN HYDROCHLORIDE 500 MILLIGRAM(S): 850 TABLET ORAL at 20:04

## 2020-06-24 RX ADMIN — METFORMIN HYDROCHLORIDE 500 MILLIGRAM(S): 850 TABLET ORAL at 09:35

## 2020-06-24 RX ADMIN — ALBUTEROL 2 PUFF(S): 90 AEROSOL, METERED ORAL at 17:42

## 2020-06-24 RX ADMIN — Medication 100 MILLIGRAM(S): at 00:10

## 2020-06-24 RX ADMIN — GABAPENTIN 400 MILLIGRAM(S): 400 CAPSULE ORAL at 13:02

## 2020-06-24 RX ADMIN — Medication 50 MILLIGRAM(S): at 11:41

## 2020-06-24 RX ADMIN — Medication 1: at 16:26

## 2020-06-24 RX ADMIN — QUETIAPINE FUMARATE 200 MILLIGRAM(S): 200 TABLET, FILM COATED ORAL at 20:04

## 2020-06-24 RX ADMIN — Medication 100 MILLIGRAM(S): at 04:58

## 2020-06-24 RX ADMIN — HALOPERIDOL DECANOATE 5 MILLIGRAM(S): 100 INJECTION INTRAMUSCULAR at 11:43

## 2020-06-24 RX ADMIN — Medication 2 MILLIGRAM(S): at 11:42

## 2020-06-24 RX ADMIN — Medication 2 MILLIGRAM(S): at 00:10

## 2020-06-24 RX ADMIN — QUETIAPINE FUMARATE 100 MILLIGRAM(S): 200 TABLET, FILM COATED ORAL at 00:34

## 2020-06-24 RX ADMIN — GABAPENTIN 400 MILLIGRAM(S): 400 CAPSULE ORAL at 20:04

## 2020-06-24 RX ADMIN — Medication 100 MILLIGRAM(S): at 13:02

## 2020-06-24 RX ADMIN — Medication 2 MILLIGRAM(S): at 09:07

## 2020-06-24 NOTE — CONSULT NOTE ADULT - SUBJECTIVE AND OBJECTIVE BOX
HPI:    40 y.o.f was transferred here from [facility] after admission for aggressive  and bizarre behavior at the [facility] o . She has a long history of Psychiatric treatment for mood disorder and aggressive and abusive behavior. Has had multiple episodes of self mutilation with no know history of suicide attempt . At this time she has been medicated and is lethargic but responds to verbal arousal. She denies any hallucinosis or suicidal or homicidal ideation at this time .She has no complaints of drug withdrawal symptoms at this time. pat asked to see for abnormal CXR, no fever, no sob, no cough, h/o smoking 2PPPD, h/o asthma.    PAST MEDICAL & SURGICAL HISTORY:  Asthma  Schizophrenia  Depression  No pertinent past medical history  Bipolar 1 disorder  Schizo affective schizophrenia  No significant past surgical history  No significant past surgical history  No significant past surgical history      Home Medications:  Cogentin 1 mg oral tablet: 1 tab(s) orally 2 times a day (15 Mario 2020 10:57)  Depakote 500 mg oral delayed release tablet: 1 tab(s) orally 3 times a day (15 Mario 2020 10:57)  Glucophage 500 mg oral tablet: 1 tab(s) orally 2 times a day (15 Mario 2020 10:57)  Haldol 10 mg oral tablet: 1 tab(s) orally 3 times a day (15 Mario 2020 10:57)  Neurontin 600 mg oral tablet: 1 tab(s) orally 3 times a day (15 Mario 2020 10:57)      MEDICATIONS  (STANDING):  chlorproMAZINE    Tablet 100 milliGRAM(s) Oral three times a day  dextrose 5%. 1000 milliLiter(s) (50 mL/Hr) IV Continuous <Continuous>  dextrose 50% Injectable 12.5 Gram(s) IV Push once  dextrose 50% Injectable 25 Gram(s) IV Push once  dextrose 50% Injectable 25 Gram(s) IV Push once  gabapentin 400 milliGRAM(s) Oral three times a day  insulin lispro (HumaLOG) corrective regimen sliding scale   SubCutaneous three times a day before meals  metFORMIN 500 milliGRAM(s) Oral two times a day  nicotine - 21 mG/24Hr(s) Patch 1 patch Transdermal daily  QUEtiapine 100 milliGRAM(s) Oral at bedtime    MEDICATIONS  (PRN):  acetaminophen   Tablet .. 650 milliGRAM(s) Oral every 6 hours PRN Mild Pain (1 - 3), Moderate Pain (4 - 6), Severe Pain (7 - 10)  ALBUTerol    90 MICROgram(s) HFA Inhaler 2 Puff(s) Inhalation every 6 hours PRN Shortness of Breath  chlorproMAZINE    Injectable 50 milliGRAM(s) IntraMuscular every 8 hours PRN Agitation  chlorproMAZINE    Tablet 100 milliGRAM(s) Oral every 6 hours PRN Agitation  dextrose 40% Gel 15 Gram(s) Oral once PRN Blood Glucose LESS THAN 70 milliGRAM(s)/deciliter  diphenhydrAMINE   Injectable 50 milliGRAM(s) IntraMuscular every 8 hours PRN Extrapyramidal prophylaxis  diphenhydrAMINE   Injectable 50 milliGRAM(s) IntraMuscular once PRN Extrapyramidal prophylaxis  glucagon  Injectable 1 milliGRAM(s) IntraMuscular once PRN Glucose LESS THAN 70 milligrams/deciliter  LORazepam     Tablet 2 milliGRAM(s) Oral every 6 hours PRN Agitation  nicotine  Polacrilex Gum 2 milliGRAM(s) Oral every 2 hours PRN nicotine cravings      Allergies    eggs (Unknown)  penicillin (Hives)  penicillin (Short breath)  shellfish (Hives)  shellfish (Rash)    Intolerances        SOCIAL HISTORY: Denies tobacco, etoh abuse or illicit drug use    FAMILY HISTORY:      Vital Signs Last 24 Hrs  T(C): --  T(F): --  HR: --  BP: --  BP(mean): --  RR: 18 (2020 07:18) (18 - 18)  SpO2: 100% (2020 07:18) (100% - 100%)        REVIEW OF SYSTEMS:    CONSTITUTIONAL:  As per HPI.  HEENT:  Eyes:  No diplopia or blurred vision. ENT:  No earache, sore throat or runny nose.  CARDIOVASCULAR:  No pressure, squeezing, tightness, heaviness or aching about the chest, neck, axilla or epigastrium.  RESPIRATORY:  No cough, shortness of breath, PND or orthopnea.  GASTROINTESTINAL:  No nausea, vomiting or diarrhea.  GENITOURINARY:  No dysuria, frequency or urgency.  MUSCULOSKELETAL:  As per HPI.  SKIN:  No change in skin, hair or nails.  NEUROLOGIC:  No paresthesias, fasciculations, seizures or weakness.  PSYCHIATRIC:  No disorder of thought or mood.  ENDOCRINE:  No heat or cold intolerance, polyuria or polydipsia.  HEMATOLOGICAL:  No easy bruising or bleedings:  .     PHYSICAL EXAMINATION:    GENERAL APPEARANCE:  Pt. is not currently dyspneic, in no distress. Pt. is alert, oriented, and pleasant.  HEENT:  Pupils are normal and react normally. No icterus. Mucous membranes well colored.  NECK:  Supple. No lymphadenopathy. Jugular venous pressure not elevated. Carotids equal.   HEART:   The cardiac impulse has a normal quality. Regular. Normal S1 and S2. There are no murmurs, rubs or gallops noted  CHEST:  Chest is clear to auscultation. Normal respiratory effort.  ABDOMEN:  Soft and nontender.   EXTREMITIES:  There is no cyanosis, clubbing or edema.   SKIN:  No rash or significant lesions are noted.    LABS:    Urinalysis Basic - ( 2020 05:00 )    Color: Yellow / Appearance: Clear / S.010 / pH: x  Gluc: x / Ketone: Negative  / Bili: Negative / Urobili: Negative mg/dL   Blood: x / Protein: Negative mg/dL / Nitrite: Negative   Leuk Esterase: Trace / RBC: 0-2 /HPF / WBC 3-5   Sq Epi: x / Non Sq Epi: Moderate / Bacteria: Moderate            RADIOLOGY & ADDITIONAL STUDIES:     Xray Chest 1 View- PORTABLE-Urgent (20 @ 01:36) >  FINDINGS:    Single frontal view of the chest demonstrates bilateral lower lobe infiltrates. Follow-up to resolution. The cardiomediastinal silhouette is normal. No acute osseous abnormalities. Overlying EKG leads and wires are noted    IMPRESSION: Bilateral lower lobe infiltrates.

## 2020-06-24 NOTE — PROGRESS NOTE BEHAVIORAL HEALTH - NSBHFUPINTERVALHXFT_PSY_A_CORE
06/24/20: Patient was seen today AM, was not able to redirect, so received PRN IM Thorazine/Benadryl for stability. She was upset again so she received another dose of Haldol 5 mg with Benadryl 50 mg and Ativan 2 mg IM for safety/stability. She has poor boundaries, tried to explain her multiple times. Overall a little improvement and able to re-direct. Seroquel increased to 2 mg HS for sleep, Neurontin 400 mg TID added for Mood/Pain.

## 2020-06-24 NOTE — CONSULT NOTE ADULT - ASSESSMENT
PROBLEMS:    Pulmonary infiltrate on chest x-ray  ASTHMA  Schizo affective schizophrenia  Essential hypertension  Uncontrolled type 2 diabetes mellitus with hyperglycemia    PLAN:    PAT Asymptomatic, repeat CXR if same or improving no intervention, if worsening may consider CT chest, COVID neg,  aerosols prn  continue current rx  dvt prophylasix
41 YO AA Female with Schizoaffective Disorder manifest labile and volatile behavior with aggression and agitation  directed at anyone near her. Her current state of decompensation requires inpatient Psychiatric management.  She has Diabetes Mellitus and Hypertension which require medical management while inpatient and OPT follow up.  Her chest X-ray on 6/19 /20 revealed bilateral lower lobe infiltrate which should be evaluated by Pulmonologist for possible further investigation.

## 2020-06-25 PROCEDURE — 99231 SBSQ HOSP IP/OBS SF/LOW 25: CPT

## 2020-06-25 RX ORDER — HALOPERIDOL DECANOATE 100 MG/ML
5 INJECTION INTRAMUSCULAR ONCE
Refills: 0 | Status: COMPLETED | OUTPATIENT
Start: 2020-06-25 | End: 2020-06-25

## 2020-06-25 RX ORDER — QUETIAPINE FUMARATE 200 MG/1
300 TABLET, FILM COATED ORAL AT BEDTIME
Refills: 0 | Status: DISCONTINUED | OUTPATIENT
Start: 2020-06-25 | End: 2020-06-29

## 2020-06-25 RX ORDER — HALOPERIDOL DECANOATE 100 MG/ML
5 INJECTION INTRAMUSCULAR EVERY 6 HOURS
Refills: 0 | Status: DISCONTINUED | OUTPATIENT
Start: 2020-06-25 | End: 2020-06-29

## 2020-06-25 RX ORDER — DIPHENHYDRAMINE HCL 50 MG
50 CAPSULE ORAL EVERY 6 HOURS
Refills: 0 | Status: DISCONTINUED | OUTPATIENT
Start: 2020-06-25 | End: 2020-06-29

## 2020-06-25 RX ORDER — GABAPENTIN 400 MG/1
600 CAPSULE ORAL THREE TIMES A DAY
Refills: 0 | Status: DISCONTINUED | OUTPATIENT
Start: 2020-06-25 | End: 2020-06-29

## 2020-06-25 RX ADMIN — Medication 1: at 07:50

## 2020-06-25 RX ADMIN — Medication 50 MILLIGRAM(S): at 11:55

## 2020-06-25 RX ADMIN — Medication 100 MILLIGRAM(S): at 08:32

## 2020-06-25 RX ADMIN — METFORMIN HYDROCHLORIDE 500 MILLIGRAM(S): 850 TABLET ORAL at 20:15

## 2020-06-25 RX ADMIN — Medication 50 MILLIGRAM(S): at 12:19

## 2020-06-25 RX ADMIN — Medication 2 MILLIGRAM(S): at 21:58

## 2020-06-25 RX ADMIN — Medication 100 MILLIGRAM(S): at 11:29

## 2020-06-25 RX ADMIN — Medication 50 MILLIGRAM(S): at 21:57

## 2020-06-25 RX ADMIN — Medication 2 MILLIGRAM(S): at 16:00

## 2020-06-25 RX ADMIN — GABAPENTIN 400 MILLIGRAM(S): 400 CAPSULE ORAL at 08:32

## 2020-06-25 RX ADMIN — Medication 50 MILLIGRAM(S): at 16:05

## 2020-06-25 RX ADMIN — Medication 2 MILLIGRAM(S): at 08:33

## 2020-06-25 RX ADMIN — GABAPENTIN 600 MILLIGRAM(S): 400 CAPSULE ORAL at 20:15

## 2020-06-25 RX ADMIN — HALOPERIDOL DECANOATE 5 MILLIGRAM(S): 100 INJECTION INTRAMUSCULAR at 21:57

## 2020-06-25 RX ADMIN — HALOPERIDOL DECANOATE 5 MILLIGRAM(S): 100 INJECTION INTRAMUSCULAR at 12:18

## 2020-06-25 RX ADMIN — GABAPENTIN 400 MILLIGRAM(S): 400 CAPSULE ORAL at 12:39

## 2020-06-25 RX ADMIN — Medication 1: at 17:47

## 2020-06-25 RX ADMIN — Medication 2 MILLIGRAM(S): at 12:18

## 2020-06-25 RX ADMIN — Medication 1: at 11:32

## 2020-06-25 RX ADMIN — Medication 100 MILLIGRAM(S): at 20:15

## 2020-06-25 RX ADMIN — Medication 100 MILLIGRAM(S): at 16:05

## 2020-06-25 RX ADMIN — Medication 100 MILLIGRAM(S): at 12:39

## 2020-06-25 RX ADMIN — METFORMIN HYDROCHLORIDE 500 MILLIGRAM(S): 850 TABLET ORAL at 08:34

## 2020-06-25 NOTE — PROGRESS NOTE ADULT - ASSESSMENT
PROBLEMS:    Pulmonary infiltrate on chest x-ray  ASTHMA  Schizo affective schizophrenia  Essential hypertension  Uncontrolled type 2 diabetes mellitus with hyperglycemia    PLAN:    Repeat CXR shows resolution of infiltrates, pulmonary stable, no further intervention, fu PRN  aerosols prn  continue current rx  dvt prophylasix

## 2020-06-25 NOTE — PROGRESS NOTE ADULT - SUBJECTIVE AND OBJECTIVE BOX
Subjective:    pat better, no new complaint, CXR no infiltrates.    Home Medications:  Cogentin 1 mg oral tablet: 1 tab(s) orally 2 times a day (15 Mario 2020 10:57)  Depakote 500 mg oral delayed release tablet: 1 tab(s) orally 3 times a day (15 Mario 2020 10:57)  Glucophage 500 mg oral tablet: 1 tab(s) orally 2 times a day (15 Mario 2020 10:57)  Haldol 10 mg oral tablet: 1 tab(s) orally 3 times a day (15 Mario 2020 10:57)  Neurontin 600 mg oral tablet: 1 tab(s) orally 3 times a day (15 Mario 2020 10:57)    MEDICATIONS  (STANDING):  chlorproMAZINE    Tablet 100 milliGRAM(s) Oral three times a day  dextrose 5%. 1000 milliLiter(s) (50 mL/Hr) IV Continuous <Continuous>  dextrose 50% Injectable 12.5 Gram(s) IV Push once  dextrose 50% Injectable 25 Gram(s) IV Push once  dextrose 50% Injectable 25 Gram(s) IV Push once  gabapentin 400 milliGRAM(s) Oral three times a day  insulin lispro (HumaLOG) corrective regimen sliding scale   SubCutaneous three times a day before meals  metFORMIN 500 milliGRAM(s) Oral two times a day  nicotine - 21 mG/24Hr(s) Patch 1 patch Transdermal daily  QUEtiapine 200 milliGRAM(s) Oral at bedtime    MEDICATIONS  (PRN):  acetaminophen   Tablet .. 650 milliGRAM(s) Oral every 6 hours PRN Mild Pain (1 - 3), Moderate Pain (4 - 6), Severe Pain (7 - 10)  ALBUTerol    90 MICROgram(s) HFA Inhaler 2 Puff(s) Inhalation every 6 hours PRN Shortness of Breath  chlorproMAZINE    Injectable 50 milliGRAM(s) IntraMuscular every 8 hours PRN Agitation  chlorproMAZINE    Tablet 100 milliGRAM(s) Oral every 6 hours PRN Agitation  dextrose 40% Gel 15 Gram(s) Oral once PRN Blood Glucose LESS THAN 70 milliGRAM(s)/deciliter  diphenhydrAMINE 50 milliGRAM(s) Oral every 6 hours PRN Extrapyramidal prophylaxis  diphenhydrAMINE   Injectable 50 milliGRAM(s) IntraMuscular every 8 hours PRN Extrapyramidal prophylaxis  diphenhydrAMINE   Injectable 50 milliGRAM(s) IntraMuscular once PRN Extrapyramidal prophylaxis  glucagon  Injectable 1 milliGRAM(s) IntraMuscular once PRN Glucose LESS THAN 70 milligrams/deciliter  LORazepam     Tablet 2 milliGRAM(s) Oral every 6 hours PRN Agitation  nicotine  Polacrilex Gum 2 milliGRAM(s) Oral every 2 hours PRN nicotine cravings      Allergies    eggs (Unknown)  penicillin (Hives)  penicillin (Short breath)  shellfish (Hives)  shellfish (Rash)    Intolerances        Vital Signs Last 24 Hrs  T(C): 36.8 (25 Jun 2020 07:38), Max: 36.8 (25 Jun 2020 07:38)  T(F): 98.2 (25 Jun 2020 07:38), Max: 98.2 (25 Jun 2020 07:38)  HR: --  BP: --  BP(mean): --  RR: 18 (25 Jun 2020 07:38) (18 - 18)  SpO2: 100% (25 Jun 2020 07:38) (100% - 100%)      PHYSICAL EXAMINATION:    NECK:  Supple. No lymphadenopathy. Jugular venous pressure not elevated. Carotids equal.   HEART:   The cardiac impulse has a normal quality. Reg., Nl S1 and S2.  There are no murmurs, rubs or gallops noted  CHEST:  Chest is clear to auscultation. Normal respiratory effort.  ABDOMEN:  Soft and nontender.   EXTREMITIES:  There is no edema.       LABS:      Xray Chest 1 View- PORTABLE-Urgent (06.24.20 @ 17:49) >  FINDINGS:   The lungs are clear of airspace consolidations or effusions. No pneumothorax.    The heart and mediastinum are within normal limits.    Visualized osseous structures are intact.        IMPRESSION:   No evidence of active chest disease.

## 2020-06-25 NOTE — PROGRESS NOTE BEHAVIORAL HEALTH - NSBHFUPINTERVALHXFT_PSY_A_CORE
06/25/20: Patient was seen today AM, and endorsed that she would like to leave before July 4th and it was decided that she would possibly leave on 07/02. Still attention seeking and acts up easily so she received 1 diose of PRN today of Haldol 5 mg with Ativan 2 mg. Seems a little improved in mood, has urinary incontinence at night and blames it to Seroquel, and she has DM and was advised to control her glucose for adequate urinary continence. Seroquel further increased to 300 mg HS and Neurontin further increased to 600 mg TID.

## 2020-06-26 DIAGNOSIS — J45.909 UNSPECIFIED ASTHMA, UNCOMPLICATED: ICD-10-CM

## 2020-06-26 PROCEDURE — 99231 SBSQ HOSP IP/OBS SF/LOW 25: CPT

## 2020-06-26 RX ORDER — OLANZAPINE 15 MG/1
10 TABLET, FILM COATED ORAL ONCE
Refills: 0 | Status: COMPLETED | OUTPATIENT
Start: 2020-06-26 | End: 2020-06-26

## 2020-06-26 RX ADMIN — Medication 50 MILLIGRAM(S): at 07:30

## 2020-06-26 RX ADMIN — GABAPENTIN 600 MILLIGRAM(S): 400 CAPSULE ORAL at 23:26

## 2020-06-26 RX ADMIN — Medication 1: at 17:04

## 2020-06-26 RX ADMIN — Medication 2: at 11:47

## 2020-06-26 RX ADMIN — Medication 2 MILLIGRAM(S): at 23:26

## 2020-06-26 RX ADMIN — GABAPENTIN 600 MILLIGRAM(S): 400 CAPSULE ORAL at 09:26

## 2020-06-26 RX ADMIN — Medication 650 MILLIGRAM(S): at 20:15

## 2020-06-26 RX ADMIN — METFORMIN HYDROCHLORIDE 500 MILLIGRAM(S): 850 TABLET ORAL at 09:27

## 2020-06-26 RX ADMIN — OLANZAPINE 10 MILLIGRAM(S): 15 TABLET, FILM COATED ORAL at 14:39

## 2020-06-26 RX ADMIN — HALOPERIDOL DECANOATE 5 MILLIGRAM(S): 100 INJECTION INTRAMUSCULAR at 07:59

## 2020-06-26 RX ADMIN — GABAPENTIN 600 MILLIGRAM(S): 400 CAPSULE ORAL at 14:38

## 2020-06-26 RX ADMIN — METFORMIN HYDROCHLORIDE 500 MILLIGRAM(S): 850 TABLET ORAL at 23:27

## 2020-06-26 RX ADMIN — Medication 100 MILLIGRAM(S): at 23:26

## 2020-06-26 RX ADMIN — Medication 2 MILLIGRAM(S): at 08:00

## 2020-06-26 RX ADMIN — Medication 100 MILLIGRAM(S): at 09:27

## 2020-06-26 RX ADMIN — Medication 100 MILLIGRAM(S): at 07:30

## 2020-06-26 RX ADMIN — QUETIAPINE FUMARATE 300 MILLIGRAM(S): 200 TABLET, FILM COATED ORAL at 23:27

## 2020-06-26 NOTE — PROGRESS NOTE BEHAVIORAL HEALTH - NSBHFUPINTERVALHXFT_PSY_A_CORE
Pt continues with labile intense mood and affect, frequent need to be intrusive and making unrealistic demands. Pt continues with labile intense mood and affect, frequent need to be intrusive and making unrealistic demands. Pt again banging the plexiglas and desk at the nursing station as she was yelling out her demands and being verbally aggressive. Pt in recent past given haldol or thorazine with little decrease in behavioral dyscontrol  Pt given zyprexa 10mg IM  and ativan 2mg IM

## 2020-06-27 RX ORDER — HALOPERIDOL DECANOATE 100 MG/ML
7.5 INJECTION INTRAMUSCULAR ONCE
Refills: 0 | Status: COMPLETED | OUTPATIENT
Start: 2020-06-27 | End: 2020-06-27

## 2020-06-27 RX ORDER — DIPHENHYDRAMINE HCL 50 MG
50 CAPSULE ORAL ONCE
Refills: 0 | Status: COMPLETED | OUTPATIENT
Start: 2020-06-27 | End: 2020-06-27

## 2020-06-27 RX ADMIN — Medication 50 MILLIGRAM(S): at 13:15

## 2020-06-27 RX ADMIN — Medication 650 MILLIGRAM(S): at 10:45

## 2020-06-27 RX ADMIN — Medication 2 MILLIGRAM(S): at 13:15

## 2020-06-27 RX ADMIN — GABAPENTIN 600 MILLIGRAM(S): 400 CAPSULE ORAL at 12:15

## 2020-06-27 RX ADMIN — Medication 3: at 18:05

## 2020-06-27 RX ADMIN — Medication 2 MILLIGRAM(S): at 04:54

## 2020-06-27 RX ADMIN — Medication 650 MILLIGRAM(S): at 04:54

## 2020-06-27 RX ADMIN — Medication 100 MILLIGRAM(S): at 12:16

## 2020-06-27 RX ADMIN — Medication 2: at 11:42

## 2020-06-27 RX ADMIN — Medication 100 MILLIGRAM(S): at 09:24

## 2020-06-27 RX ADMIN — Medication 100 MILLIGRAM(S): at 04:54

## 2020-06-27 RX ADMIN — Medication 50 MILLIGRAM(S): at 12:15

## 2020-06-27 RX ADMIN — METFORMIN HYDROCHLORIDE 500 MILLIGRAM(S): 850 TABLET ORAL at 09:23

## 2020-06-27 RX ADMIN — HALOPERIDOL DECANOATE 7.5 MILLIGRAM(S): 100 INJECTION INTRAMUSCULAR at 21:46

## 2020-06-27 RX ADMIN — Medication 2 MILLIGRAM(S): at 20:00

## 2020-06-27 RX ADMIN — Medication 1: at 07:34

## 2020-06-27 RX ADMIN — Medication 50 MILLIGRAM(S): at 21:51

## 2020-06-27 RX ADMIN — GABAPENTIN 600 MILLIGRAM(S): 400 CAPSULE ORAL at 09:23

## 2020-06-27 RX ADMIN — Medication 1 MILLIGRAM(S): at 21:45

## 2020-06-28 PROCEDURE — 99232 SBSQ HOSP IP/OBS MODERATE 35: CPT

## 2020-06-28 RX ADMIN — Medication 650 MILLIGRAM(S): at 16:20

## 2020-06-28 RX ADMIN — Medication 650 MILLIGRAM(S): at 22:01

## 2020-06-28 RX ADMIN — METFORMIN HYDROCHLORIDE 500 MILLIGRAM(S): 850 TABLET ORAL at 19:57

## 2020-06-28 RX ADMIN — ALBUTEROL 2 PUFF(S): 90 AEROSOL, METERED ORAL at 15:38

## 2020-06-28 RX ADMIN — GABAPENTIN 600 MILLIGRAM(S): 400 CAPSULE ORAL at 19:57

## 2020-06-28 RX ADMIN — QUETIAPINE FUMARATE 300 MILLIGRAM(S): 200 TABLET, FILM COATED ORAL at 19:57

## 2020-06-28 RX ADMIN — Medication 100 MILLIGRAM(S): at 19:57

## 2020-06-28 RX ADMIN — Medication 50 MILLIGRAM(S): at 22:01

## 2020-06-28 RX ADMIN — Medication 100 MILLIGRAM(S): at 23:30
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
## 2020-06-28 NOTE — PROGRESS NOTE BEHAVIORAL HEALTH - NSBHCHARTREVIEWIMAGING_PSY_A_CORE FT
MEDICATIONS  (STANDING):  chlorproMAZINE    Tablet 100 milliGRAM(s) Oral three times a day  dextrose 5%. 1000 milliLiter(s) (50 mL/Hr) IV Continuous <Continuous>  dextrose 50% Injectable 12.5 Gram(s) IV Push once  dextrose 50% Injectable 25 Gram(s) IV Push once  dextrose 50% Injectable 25 Gram(s) IV Push once  gabapentin 600 milliGRAM(s) Oral three times a day  insulin lispro (HumaLOG) corrective regimen sliding scale   SubCutaneous three times a day before meals  metFORMIN 500 milliGRAM(s) Oral two times a day  nicotine - 21 mG/24Hr(s) Patch 1 patch Transdermal daily  QUEtiapine 300 milliGRAM(s) Oral at bedtime    MEDICATIONS  (PRN):  acetaminophen   Tablet .. 650 milliGRAM(s) Oral every 6 hours PRN Mild Pain (1 - 3), Moderate Pain (4 - 6), Severe Pain (7 - 10)  ALBUTerol    90 MICROgram(s) HFA Inhaler 2 Puff(s) Inhalation every 6 hours PRN Shortness of Breath  aluminum hydroxide/magnesium hydroxide/simethicone Suspension 30 milliLiter(s) Oral every 6 hours PRN Dyspepsia  chlorproMAZINE    Injectable 50 milliGRAM(s) IntraMuscular every 8 hours PRN Agitation  chlorproMAZINE    Tablet 100 milliGRAM(s) Oral every 6 hours PRN Agitation  dextrose 40% Gel 15 Gram(s) Oral once PRN Blood Glucose LESS THAN 70 milliGRAM(s)/deciliter  diphenhydrAMINE 50 milliGRAM(s) Oral every 6 hours PRN Extrapyramidal prophylaxis  diphenhydrAMINE   Injectable 50 milliGRAM(s) IntraMuscular every 8 hours PRN Extrapyramidal prophylaxis  glucagon  Injectable 1 milliGRAM(s) IntraMuscular once PRN Glucose LESS THAN 70 milligrams/deciliter  haloperidol    Injectable 5 milliGRAM(s) IntraMuscular every 6 hours PRN Agitation  LORazepam   Injectable 2 milliGRAM(s) IntraMuscular every 6 hours PRN anxiety/agitation  nicotine  Polacrilex Gum 2 milliGRAM(s) Oral every 2 hours PRN nicotine cravings

## 2020-06-29 VITALS — OXYGEN SATURATION: 100 % | RESPIRATION RATE: 14 BRPM | TEMPERATURE: 98 F

## 2020-06-29 PROCEDURE — 99239 HOSP IP/OBS DSCHRG MGMT >30: CPT

## 2020-06-29 RX ORDER — GABAPENTIN 400 MG/1
2 CAPSULE ORAL
Qty: 180 | Refills: 0
Start: 2020-06-29 | End: 2020-07-28

## 2020-06-29 RX ORDER — QUETIAPINE FUMARATE 200 MG/1
1 TABLET, FILM COATED ORAL
Qty: 30 | Refills: 0
Start: 2020-06-29 | End: 2020-07-28

## 2020-06-29 RX ORDER — ALBUTEROL 90 UG/1
2 AEROSOL, METERED ORAL
Qty: 1 | Refills: 0
Start: 2020-06-29 | End: 2020-07-28

## 2020-06-29 RX ORDER — CHLORPROMAZINE HCL 10 MG
1 TABLET ORAL
Qty: 90 | Refills: 0
Start: 2020-06-29 | End: 2020-07-28

## 2020-06-29 RX ORDER — METFORMIN HYDROCHLORIDE 850 MG/1
1 TABLET ORAL
Qty: 60 | Refills: 0
Start: 2020-06-29 | End: 2020-07-28

## 2020-06-29 RX ADMIN — Medication 100 MILLIGRAM(S): at 08:16

## 2020-06-29 RX ADMIN — METFORMIN HYDROCHLORIDE 500 MILLIGRAM(S): 850 TABLET ORAL at 09:11

## 2020-06-29 RX ADMIN — Medication 650 MILLIGRAM(S): at 04:42

## 2020-06-29 RX ADMIN — Medication 100 MILLIGRAM(S): at 04:43

## 2020-06-29 RX ADMIN — Medication 50 MILLIGRAM(S): at 04:43

## 2020-06-29 RX ADMIN — Medication 50 MILLIGRAM(S): at 11:12

## 2020-06-29 RX ADMIN — GABAPENTIN 600 MILLIGRAM(S): 400 CAPSULE ORAL at 08:19

## 2020-06-29 NOTE — DISCHARGE NOTE BEHAVIORAL HEALTH - FAMILY HISTORY OF PSYCHIATRIC ILLNESS
Family hx of mental illness/ substance abuse denied.  Pt is single.  Pt has a daughter, age 18, who she lost custody of many years ago.  Pt's sister raised her daughter.    Pt's mother lives on [address].  Pt's father reportedly  in .  Pt is a HS graduate.  She is unemployed due to disability.  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    Pt reports living in a shelter, [place]", in [place] and being in OP dual DX [place] at [place] until   when she states she came to [place] because her father .  Pt has been homeless in [place] and has been using drugs.  Pt states she will be returning to "[facility]" in [place] upon d/c.

## 2020-06-29 NOTE — DISCHARGE NOTE BEHAVIORAL HEALTH - NSBHDCSWCOMMENTSFT_PSY_A_CORE
Yakelin was educated about the importance of arranging outpatient mental health and substance abuse treatment when she gets to [place], the importance of taking her medication and the importance of staying clean and sober.  She was advised not to stop taking any medication unless told to do so by a physician.  She was also educated about safety precautions for Covid 19 including hand hygiene, wearing a mask and social distancing.

## 2020-06-29 NOTE — PROGRESS NOTE BEHAVIORAL HEALTH - NS ED BHA MSE SPEECH RATE
Fast, difficult to interrupt
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
## 2020-06-29 NOTE — PROGRESS NOTE BEHAVIORAL HEALTH - SECONDARY DX1
Uncontrolled type 2 diabetes mellitus with hyperglycemia

## 2020-06-29 NOTE — PROGRESS NOTE BEHAVIORAL HEALTH - PRIMARY DX
Altered mental status, unspecified altered mental status type
Altered mental status, unspecified altered mental status type
Schizoaffective disorder, bipolar type
Altered mental status, unspecified altered mental status type

## 2020-06-29 NOTE — DISCHARGE NOTE BEHAVIORAL HEALTH - CONDITIONS AT DISCHARGE
Patient is alert, oriented x3. Patient denies any thoughts of self harm or harm to others. Nurse and  reviewed discharge plan with patient who refuses to allow  to make discharge referral on her behalf. Patient states that she will be going to [place]. All belongings returned to patient. Patient is alert, oriented x3. Patient denies any thoughts of self harm or harm to others. Nurse and  reviewed discharge plan with patient who refuses to allow  to make discharge referral on her behalf. Patient states that she will be going to [place]. Patient received a copy of her discharge instructions. All belongings returned to patient.

## 2020-06-29 NOTE — PROGRESS NOTE BEHAVIORAL HEALTH - NSBHFUPINTERVALHXFT_PSY_A_CORE
Patient was seen today AM. Mood in control, complaint with meds, endorsing good sleep/appetite. Denied S/H/I/P, last SA many years back in [id number]. She is compliant and her speech is less slurry and able to understand well, she plans to go to [place] and to F/U the at [facility]. She refused any referral here and was given 30 days of meds at [facility]. Blood Glucose today was 135

## 2020-06-29 NOTE — PROGRESS NOTE BEHAVIORAL HEALTH - NSBHFUPINTERVALCCFT_PSY_A_CORE
" Agitation "
" People shouldn't  me. It's not right."
" People shouldn't  me. It's not right."
"You' all give me what I want when I want."
" Agitation "

## 2020-06-29 NOTE — PROGRESS NOTE BEHAVIORAL HEALTH - NSBHADMITIPOBSFT_PSY_A_CORE
Routine Admission

## 2020-06-29 NOTE — PROGRESS NOTE BEHAVIORAL HEALTH - NSBHADMITIPBHPROVFT_PSY_A_CORE
Spoke with 

## 2020-06-29 NOTE — PROGRESS NOTE BEHAVIORAL HEALTH - AXIS III
see medical hpi
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
## 2020-06-29 NOTE — PROGRESS NOTE BEHAVIORAL HEALTH - THOUGHT CONTENT
Preoccupations
Preoccupations/Ruminations
Ruminations/Preoccupations
Preoccupations

## 2020-06-29 NOTE — DISCHARGE NOTE BEHAVIORAL HEALTH - NSBHDCADDR1FT_A_CORE
Yakelin states she was in treatment at [facility] in [place] when living in the shelter and will return to this facility.  She states she knows how to arrange treatment.

## 2020-06-29 NOTE — PROGRESS NOTE BEHAVIORAL HEALTH - NSBHCHARTREVIEWVS_PSY_A_CORE FT
Vital Signs Last 24 Hrs  T(C): --  T(F): --  HR: --  BP: --  BP(mean): --  RR: 18 (24 Jun 2020 07:18) (18 - 18)  SpO2: 100% (24 Jun 2020 07:18) (100% - 100%)
Vital Signs Last 24 Hrs  T(C): 36.4 (26 Jun 2020 07:13), Max: 36.4 (26 Jun 2020 07:13)  T(F): 97.5 (26 Jun 2020 07:13), Max: 97.5 (26 Jun 2020 07:13)  HR: --  BP: --  BP(mean): --  RR: 14 (26 Jun 2020 07:13) (14 - 14)  SpO2: 100% (26 Jun 2020 07:13) (100% - 100%)
Vital Signs Last 24 Hrs  T(C): 36.7 (22 Jun 2020 07:09), Max: 36.7 (22 Jun 2020 07:09)  T(F): 98.1 (22 Jun 2020 07:09), Max: 98.1 (22 Jun 2020 07:09)  HR: --  BP: --  BP(mean): --  RR: 14 (22 Jun 2020 07:09) (14 - 14)  SpO2: 100% (22 Jun 2020 07:09) (100% - 100%)
Vital Signs Last 24 Hrs  T(C): 36.7 (23 Jun 2020 07:38), Max: 36.7 (23 Jun 2020 07:38)  T(F): 98.1 (23 Jun 2020 07:38), Max: 98.1 (23 Jun 2020 07:38)  HR: --  BP: --  BP(mean): --  RR: 14 (23 Jun 2020 07:38) (14 - 14)  SpO2: 100% (23 Jun 2020 07:38) (100% - 100%)
Vital Signs Last 24 Hrs  T(C): 36.7 (29 Jun 2020 07:23), Max: 36.7 (29 Jun 2020 07:23)  T(F): 98.1 (29 Jun 2020 07:23), Max: 98.1 (29 Jun 2020 07:23)  HR: --  BP: --  BP(mean): --  RR: 14 (29 Jun 2020 07:23) (14 - 14)  SpO2: 100% (29 Jun 2020 07:23) (100% - 100%)
Vital Signs Last 24 Hrs  T(C): 36.8 (21 Jun 2020 07:21), Max: 36.8 (21 Jun 2020 07:21)  T(F): 98.2 (21 Jun 2020 07:21), Max: 98.2 (21 Jun 2020 07:21)  HR: --  BP: --  BP(mean): --  RR: --  SpO2: 100% (21 Jun 2020 07:21) (100% - 100%)
Vital Signs Last 24 Hrs  T(C): 36.8 (25 Jun 2020 07:38), Max: 36.8 (25 Jun 2020 07:38)  T(F): 98.2 (25 Jun 2020 07:38), Max: 98.2 (25 Jun 2020 07:38)  HR: --  BP: --  BP(mean): --  RR: 18 (25 Jun 2020 07:38) (18 - 18)  SpO2: 100% (25 Jun 2020 07:38) (100% - 100%)
Vital Signs Last 24 Hrs  T(C): 37.2 (28 Jun 2020 07:18), Max: 37.2 (28 Jun 2020 07:18)  T(F): 99 (28 Jun 2020 07:18), Max: 99 (28 Jun 2020 07:18)  HR: --  BP: --  BP(mean): --  RR: 18 (28 Jun 2020 07:18) (18 - 18)  SpO2: 99% (28 Jun 2020 07:18) (99% - 99%)
Vital Signs Last 24 Hrs  T(C): --  T(F): --  HR: --  BP: --  BP(mean): --  RR: --  SpO2: --

## 2020-06-29 NOTE — PROGRESS NOTE BEHAVIORAL HEALTH - ESTIMATED INTELLIGENCE
Below Average

## 2020-06-29 NOTE — PROGRESS NOTE BEHAVIORAL HEALTH - NSBHADMITDANGERSELF_PSY_A_CORE
suicidal behavior
suicidal behavior/upon admission
upon admission/suicidal behavior

## 2020-06-29 NOTE — DISCHARGE NOTE BEHAVIORAL HEALTH - NSBHDCHOUSINGFT_PSY_A_CORE
Ms. Huff is homeless.  She came to [place] from [place] about 6 mos. ago and states she plans to take a [employer] bus directly to return to [place] upon discharge.  She states she has a shelter she has been living in in [place] and will return there.  She refuses to allow  to be involved in discharge planning or to contact shelter.  She states her mother will get her a bus ticket.  She refuses to allow contact with mother. Ms. Huff is homeless.  She came to [place] from [place] about 6 mos. ago and states she plans to take a [employer] bus directly to return to [place] upon discharge.  She states she has a shelter she has been living in in [place] and will return there.  She refuses to allow  to be involved in discharge planning or to contact shelter.  She states her mother will get her a bus ticket.  She refuses to allow contact with mother. Ms. Huff is homeless.  She came to [place] from [place] about 6 mos. ago and states she plans to take a [employer] bus directly to return to [place] upon discharge.  She states she has a shelter she has been living in [place] and will return there.  She refuses to allow  to be involved in discharge planning or to contact shelter.  She states her mother will get her a bus ticket.  She refuses to allow contact with mother.

## 2020-06-29 NOTE — PROGRESS NOTE BEHAVIORAL HEALTH - AFFECT CONGRUENCE
Not congruent
Congruent
Congruent
Not congruent

## 2020-06-29 NOTE — PROGRESS NOTE BEHAVIORAL HEALTH - NS ED BHA REVIEW OF ED CHART AVAILABLE IMAGING REVIEWED
None available
Yes
Yes
None available

## 2020-06-29 NOTE — DISCHARGE NOTE BEHAVIORAL HEALTH - SECONDARY DIAGNOSIS.
Mild intermittent asthma, unspecified whether complicated Uncontrolled type 2 diabetes mellitus with hyperglycemia

## 2020-06-29 NOTE — PROGRESS NOTE BEHAVIORAL HEALTH - NS ED BHA MED ROS GENITOURINARY
Unable to assess

## 2020-06-29 NOTE — PROGRESS NOTE BEHAVIORAL HEALTH - NSBHCHARTREVIEWINVESTIGATE_PSY_A_CORE FT
< from: 12 Lead ECG (06.11.20 @ 12:56) >    Ventricular Rate 93 BPM    Atrial Rate 93 BPM    P-R Interval 150 ms    QRS Duration 82 ms    Q-T Interval 364 ms    QTC Calculation(Bezet) 452 ms    P Axis 59 degrees    R Axis 37 degrees    T Axis 63 degrees    Diagnosis Line Normal sinus rhythm  Possible Left atrial enlargement  Nonspecific T wave abnormality  Abnormal ECG    Confirmed by Porter Navas ([id number]) on 6/12/2020 7:54:10 PM

## 2020-06-29 NOTE — PROGRESS NOTE BEHAVIORAL HEALTH - RISK ASSESSMENT
Low--Only verbal aggression but no hostility noted.

## 2020-06-29 NOTE — PROGRESS NOTE BEHAVIORAL HEALTH - NSBHADMITMEDEDUDETAILS_A_CORE FT
Discussed risks/benefits/s-e

## 2020-06-29 NOTE — PROGRESS NOTE BEHAVIORAL HEALTH - BEHAVIOR
Hostile/Uncooperative
Uncooperative/Hostile
Other
Other
Hostile/Uncooperative

## 2020-06-29 NOTE — PROGRESS NOTE BEHAVIORAL HEALTH - NSBHCHARTREVIEWLAB_PSY_A_CORE FT
CBC  Chem--WNL

## 2020-06-29 NOTE — DISCHARGE NOTE BEHAVIORAL HEALTH - MEDICATION SUMMARY - MEDICATIONS TO TAKE
I will START or STAY ON the medications listed below when I get home from the hospital:    gabapentin 300 mg oral capsule  -- 2 cap(s) by mouth 3 times a day x 30 days   -- Indication: For Mood    metFORMIN 500 mg oral tablet  -- 1 tab(s) by mouth 2 times a day x 30 days   -- Indication: For DM    chlorproMAZINE 100 mg oral tablet  -- 1 tab(s) by mouth 3 times a day x 30 days   -- Indication: For Mood    QUEtiapine 300 mg oral tablet  -- 1 tab(s) by mouth once a day (at bedtime) x 30 days   -- Indication: For Psychosis    albuterol 90 mcg/inh inhalation aerosol  -- 2 puff(s) inhaled every 6 hours x 30 days, As Needed -Shortness of Breath   -- Indication: For Asthma

## 2020-06-29 NOTE — DISCHARGE NOTE BEHAVIORAL HEALTH - NSBHDCSUBSTHXFT_PSY_A_CORE
Hx of polysubstance abuse/ dependence:  ETOH, marijuana and crack cocaine. Attended [facility] in 2005.  [facility] [place] in [place] in 2019. Pt has been using crack cocaine.

## 2020-06-29 NOTE — DISCHARGE NOTE BEHAVIORAL HEALTH - NSBHDCCRISISPLAN1FT_PSY_A_CORE
Tell a trusted friend or family member, tell staff at the shelter or your treatment program, go to the nearest emergency room.  You may call 911 for assistance.

## 2020-06-29 NOTE — PROGRESS NOTE BEHAVIORAL HEALTH - NSBHLEGALSTATUS_PSY_A_CORE
9.37/9.27 (2PC)
9.27 (2PC)/9.37
9.37/9.27 (2PC)
9.27 (2PC)/9.37

## 2020-06-29 NOTE — DISCHARGE NOTE BEHAVIORAL HEALTH - MEDICATION SUMMARY - MEDICATIONS TO STOP TAKING
I will STOP taking the medications listed below when I get home from the hospital:    Latuda 80 mg oral tablet  -- 1 TAB BID with meals  -- Avoid grapefruit and grapefruit juice while taking this medication.  Check with your doctor before becoming pregnant.  Do not drink alcoholic beverages when taking this medication.  It is very important that you take or use this exactly as directed.  Do not skip doses or discontinue unless directed by your doctor.  May cause drowsiness.  Alcohol may intensify this effect.  Use care when operating dangerous machinery.  Obtain medical advice before taking any non-prescription drugs as some may affect the action of this medication.  Take with food.  This drug may impair the ability to drive or operate machinery.  Use care until you become familiar with its effects.    haloperidol 10 mg oral tablet  -- 1 tab(s) by mouth 3 times a day  -- It is very important that you take or use this exactly as directed.  Do not skip doses or discontinue unless directed by your doctor.  May cause drowsiness.  Alcohol may intensify this effect.  Use care when operating dangerous machinery.  Obtain medical advice before taking any non-prescription drugs as some may affect the action of this medication.    gabapentin 300 mg oral capsule  -- 1 cap(s) by mouth 3 times a day  -- It is very important that you take or use this exactly as directed.  Do not skip doses or discontinue unless directed by your doctor.  May cause drowsiness.  Alcohol may intensify this effect.  Use care when operating dangerous machinery.    Topamax 100 mg oral tablet  -- 1 tab(s) by mouth 2 times a day  -- Do not chew, break, or crush.  Do not drink alcoholic beverages when taking this medication.  Do not take this drug if you are pregnant.  It is very important that you take or use this exactly as directed.  Do not skip doses or discontinue unless directed by your doctor.  May cause drowsiness or dizziness.  Medication should be taken with plenty of water.  Obtain medical advice before taking any non-prescription drugs as some may affect the action of this medication.  Swallow whole.  Do not crush.  This drug may impair the ability to drive or operate machinery.  Use care until you become familiar with its effects.    Latuda 40 mg oral tablet  -- 1 tab(s) by mouth once a day (in the evening)  -- Avoid grapefruit and grapefruit juice while taking this medication.  Check with your doctor before becoming pregnant.  Do not drink alcoholic beverages when taking this medication.  It is very important that you take or use this exactly as directed.  Do not skip doses or discontinue unless directed by your doctor.  May cause drowsiness.  Alcohol may intensify this effect.  Use care when operating dangerous machinery.  Obtain medical advice before taking any non-prescription drugs as some may affect the action of this medication.  Take with food.  This drug may impair the ability to drive or operate machinery.  Use care until you become familiar with its effects.    meloxicam 7.5 mg oral tablet  -- 1 tab(s) by mouth once a day  -- Do not take this drug if you are pregnant.  Obtain medical advice before taking any non-prescription drugs as some may affect the action of this medication.  Take with food or milk.    meloxicam 7.5 mg oral tablet  -- 1 tab(s) by mouth once a day, As Needed  -- Do not take this drug if you are pregnant.  Obtain medical advice before taking any non-prescription drugs as some may affect the action of this medication.  Take with food or milk.    predniSONE 20 mg oral tablet  -- 2 tab(s) by mouth once a day  -- It is very important that you take or use this exactly as directed.  Do not skip doses or discontinue unless directed by your doctor.  Obtain medical advice before taking any non-prescription drugs as some may affect the action of this medication.  Take with food or milk.    Azithromycin 5 Day Dose Pack  -- 2 tab(s) by mouth once a day    ibuprofen 800 mg oral tablet  -- 1 tab(s) by mouth every 8 hours, with food  -- Do not take this drug if you are pregnant.  It is very important that you take or use this exactly as directed.  Do not skip doses or discontinue unless directed by your doctor.  May cause drowsiness or dizziness.  Obtain medical advice before taking any non-prescription drugs as some may affect the action of this medication.  Take with food or milk.    Cogentin 1 mg oral tablet  -- 1 tab(s) by mouth 2 times a day    Depakote 500 mg oral delayed release tablet  -- 1 tab(s) by mouth 3 times a day    Haldol 10 mg oral tablet  -- 1 tab(s) by mouth 3 times a day    doxycycline hyclate 100 mg oral capsule  -- 1 cap(s) by mouth 2 times a day   -- Avoid prolonged or excessive exposure to direct and/or artificial sunlight while taking this medication.  Do not take this drug if you are pregnant.  Finish all this medication unless otherwise directed by prescriber.  Medication should be taken with plenty of water.

## 2020-06-29 NOTE — PROGRESS NOTE BEHAVIORAL HEALTH - SUMMARY
Pt arrived by EMS after being agitated at [facility] clinic, police were called and found healed laceration marks on forearms so they referred to hospital. Since being here pt has been chronically agitated and aggressive requiring multiple injections for sedation. During a period of lucidity, reports name is Yakelin Huff : [birth year]; patient is well known to ED. She has a hx of mood disorder and cocaine abuse, malingering, c/o SI to obtain housing etc. She is irritable and appears delirious at this time. She keeps falling asleep. When awake, becomes aggressive and assaultive, inappropriately exposing self, with impulsive behavior. Pt has rec'd haldol, ativan, ketamine.   Pending urinalysis/utox  pt likely withdrawing from illicit drugs  will hold for reassessment
Pt arrived by EMS after being agitated at [facility] clinic, police were called and found healed laceration marks on forearms so they referred to hospital. Since being here pt has been chronically agitated and aggressive requiring multiple injections for sedation. During a period of lucidity, reports name is Yakelin Huff : [birth year]; patient is well known to ED. She has a hx of mood disorder and cocaine abuse, malingering, c/o SI to obtain housing etc. She is irritable and appears delirious at this time. She keeps falling asleep. When awake, becomes aggressive and assaultive, inappropriately exposing self, with impulsive behavior. Pt has rec'd haldol, ativan, ketamine.   Pending urinalysis/utox  pt likely withdrawing from illicit drugs  will hold for reassessment
Pt arrived by EMS after being agitated at [facility] clinic, police were called and found healed laceration marks on forearms so they referred to hospital. Since being here pt has been chronically agitated and aggressive requiring multiple injections for sedation. During a period of lucidity, reports name is Yakelin Huff : [birth year]; patient is well known to ED. She has a hx of mood disorder and cocaine abuse, malingering, c/o SI to obtain housing etc. She is irritable and appears delirious at this time. She keeps falling asleep. When awake, becomes aggressive and assaultive, inappropriately exposing self, with impulsive behavior. Pt has rec'd haldol, ativan, ketamine.   Pending urinalysis/utox
Pt arrived by EMS after being agitated at [facility] clinic, police were called and found healed laceration marks on forearms so they referred to hospital. Since being here pt has been chronically agitated and aggressive requiring multiple injections for sedation. During a period of lucidity, reports name is Yakelin Huff : [birth year]; patient is well known to ED. She has a hx of mood disorder and cocaine abuse, malingering, c/o SI to obtain housing etc. She is irritable and appears delirious at this time. She keeps falling asleep. When awake, becomes aggressive and assaultive, inappropriately exposing self, with impulsive behavior. Pt has rec'd haldol, ativan, ketamine.   Pending urinalysis/utox  pt likely withdrawing from illicit drugs  will hold for reassessment
Pt arrived by EMS after being agitated at [facility] clinic, police were called and found healed laceration marks on forearms so they referred to hospital. Since being here pt has been chronically agitated and aggressive requiring multiple injections for sedation. During a period of lucidity, reports name is Yakelin Huff : [birth year]; patient is well known to ED. She has a hx of mood disorder and cocaine abuse, malingering, c/o SI to obtain housing etc. She is irritable and appears delirious at this time. She keeps falling asleep. When awake, becomes aggressive and assaultive, inappropriately exposing self, with impulsive behavior. Pt has rec'd haldol, ativan, ketamine.   Pending urinalysis/utox  pt likely withdrawing from illicit drugs  will hold for reassessment
Pt arrived by EMS after being agitated at [facility] clinic, police were called and found healed laceration marks on forearms so they referred to hospital. Since being here pt has been chronically agitated and aggressive requiring multiple injections for sedation. During a period of lucidity, reports name is Yakelin Huff : [birth year]; patient is well known to ED. She has a hx of mood disorder and cocaine abuse, malingering, c/o SI to obtain housing etc. She is irritable and appears delirious at this time. She keeps falling asleep. When awake, becomes aggressive and assaultive, inappropriately exposing self, with impulsive behavior. Pt has rec'd haldol, ativan, ketamine.   Pending urinalysis/utox  pt likely withdrawing from illicit drugs  will hold for reassessment
Pt arrived by EMS after being agitated at [facility] clinic, police were called and found healed laceration marks on forearms so they referred to hospital. Since being here pt has been chronically agitated and aggressive requiring multiple injections for sedation. During a period of lucidity, reports name is Yakelin Huff : [birth year]; patient is well known to ED. She has a hx of mood disorder and cocaine abuse, malingering, c/o SI to obtain housing etc. She is irritable and appears delirious at this time. She keeps falling asleep. When awake, becomes aggressive and assaultive, inappropriately exposing self, with impulsive behavior.
Pt arrived by EMS after being agitated at [facility] clinic, police were called and found healed laceration marks on forearms so they referred to hospital. Since being here pt has been chronically agitated and aggressive requiring multiple injections for sedation. During a period of lucidity, reports name is Yakelin Huff : [birth year]; patient is well known to ED. She has a hx of mood disorder and cocaine abuse, malingering, c/o SI to obtain housing etc. She is irritable and appears delirious at this time. She keeps falling asleep. When awake, becomes aggressive and assaultive, inappropriately exposing self, with impulsive behavior.
Pt arrived by EMS after being agitated at [facility] clinic, police were called and found healed laceration marks on forearms so they referred to hospital. Since being here pt has been chronically agitated and aggressive requiring multiple injections for sedation. During a period of lucidity, reports name is Yakelin Huff : [birth year]; patient is well known to ED. She has a hx of mood disorder and cocaine abuse, malingering, c/o SI to obtain housing etc. She is irritable and appears delirious at this time. She keeps falling asleep. When awake, becomes aggressive and assaultive, inappropriately exposing self, with impulsive behavior. Pt has rec'd haldol, ativan, ketamine.   Pending urinalysis/utox  pt likely withdrawing from illicit drugs  will hold for reassessment

## 2020-06-29 NOTE — PROGRESS NOTE BEHAVIORAL HEALTH - THOUGHT PROCESS
Illogical/Impaired reasoning
Impaired reasoning/Illogical
Impaired reasoning
Impaired reasoning
Illogical/Impaired reasoning

## 2020-06-30 NOTE — CHART NOTE - NSCHARTNOTEFT_GEN_A_CORE
Patient arrived home safely, picked up medication and denies suicidal ideation according to gentleman who answered the phone but did not identify himself.

## 2020-07-01 DIAGNOSIS — Z11.59 ENCOUNTER FOR SCREENING FOR OTHER VIRAL DISEASES: ICD-10-CM

## 2020-07-01 DIAGNOSIS — Z59.0 HOMELESSNESS: ICD-10-CM

## 2020-07-01 DIAGNOSIS — E11.65 TYPE 2 DIABETES MELLITUS WITH HYPERGLYCEMIA: ICD-10-CM

## 2020-07-01 DIAGNOSIS — F25.0 SCHIZOAFFECTIVE DISORDER, BIPOLAR TYPE: ICD-10-CM

## 2020-07-01 DIAGNOSIS — Z79.84 LONG TERM (CURRENT) USE OF ORAL HYPOGLYCEMIC DRUGS: ICD-10-CM

## 2020-07-01 DIAGNOSIS — F17.210 NICOTINE DEPENDENCE, CIGARETTES, UNCOMPLICATED: ICD-10-CM

## 2020-07-01 DIAGNOSIS — J45.20 MILD INTERMITTENT ASTHMA, UNCOMPLICATED: ICD-10-CM

## 2020-07-01 DIAGNOSIS — I10 ESSENTIAL (PRIMARY) HYPERTENSION: ICD-10-CM

## 2020-07-01 SDOH — ECONOMIC STABILITY - HOUSING INSECURITY: HOMELESSNESS: Z59.0

## 2020-07-16 ENCOUNTER — EMERGENCY (EMERGENCY)
Facility: HOSPITAL | Age: 40
LOS: 1 days | Discharge: HOME | End: 2020-07-16
Attending: EMERGENCY MEDICINE
Payer: MEDICAID

## 2020-07-16 VITALS
HEART RATE: 98 BPM | SYSTOLIC BLOOD PRESSURE: 106 MMHG | TEMPERATURE: 98 F | RESPIRATION RATE: 18 BRPM | DIASTOLIC BLOOD PRESSURE: 61 MMHG | OXYGEN SATURATION: 95 %

## 2020-07-16 PROCEDURE — 99285 EMERGENCY DEPT VISIT HI MDM: CPT

## 2020-07-16 RX ORDER — MIDAZOLAM HYDROCHLORIDE 1 MG/ML
10 INJECTION, SOLUTION INTRAMUSCULAR; INTRAVENOUS ONCE
Refills: 0 | Status: DISCONTINUED | OUTPATIENT
Start: 2020-07-16 | End: 2020-07-16
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
## 2020-07-17 ENCOUNTER — EMERGENCY (EMERGENCY)
Facility: HOSPITAL | Age: 40
LOS: 1 days | Discharge: HOME | End: 2020-07-17
Attending: EMERGENCY MEDICINE
Payer: MEDICAID

## 2020-07-17 VITALS — OXYGEN SATURATION: 95 % | RESPIRATION RATE: 15 BRPM | HEART RATE: 65 BPM

## 2020-07-17 VITALS — HEIGHT: 65 IN | WEIGHT: 250 LBS

## 2020-07-17 DIAGNOSIS — F14.10 COCAINE ABUSE, UNCOMPLICATED: ICD-10-CM

## 2020-07-17 DIAGNOSIS — F25.9 SCHIZOAFFECTIVE DISORDER, UNSPECIFIED: ICD-10-CM

## 2020-07-17 DIAGNOSIS — F48.9 NONPSYCHOTIC MENTAL DISORDER, UNSPECIFIED: ICD-10-CM

## 2020-07-17 LAB
ALBUMIN SERPL ELPH-MCNC: 3.5 G/DL — SIGNIFICANT CHANGE UP (ref 3.3–5.2)
ALP SERPL-CCNC: 58 U/L — SIGNIFICANT CHANGE UP (ref 40–120)
ALT FLD-CCNC: 26 U/L — SIGNIFICANT CHANGE UP
ANION GAP SERPL CALC-SCNC: 11 MMOL/L — SIGNIFICANT CHANGE UP (ref 5–17)
APAP SERPL-MCNC: <7.5 UG/ML — LOW (ref 10–26)
AST SERPL-CCNC: 36 U/L — HIGH
BASOPHILS # BLD AUTO: 0.07 K/UL — SIGNIFICANT CHANGE UP (ref 0–0.2)
BASOPHILS NFR BLD AUTO: 0.6 % — SIGNIFICANT CHANGE UP (ref 0–2)
BILIRUB SERPL-MCNC: <0.2 MG/DL — LOW (ref 0.4–2)
BUN SERPL-MCNC: 22 MG/DL — HIGH (ref 8–20)
CALCIUM SERPL-MCNC: 8.6 MG/DL — SIGNIFICANT CHANGE UP (ref 8.6–10.2)
CHLORIDE SERPL-SCNC: 102 MMOL/L — SIGNIFICANT CHANGE UP (ref 98–107)
CO2 SERPL-SCNC: 26 MMOL/L — SIGNIFICANT CHANGE UP (ref 22–29)
CREAT SERPL-MCNC: 1.22 MG/DL — SIGNIFICANT CHANGE UP (ref 0.5–1.3)
EOSINOPHIL # BLD AUTO: 0.24 K/UL — SIGNIFICANT CHANGE UP (ref 0–0.5)
EOSINOPHIL NFR BLD AUTO: 2 % — SIGNIFICANT CHANGE UP (ref 0–6)
ETHANOL SERPL-MCNC: <10 MG/DL — SIGNIFICANT CHANGE UP
GLUCOSE SERPL-MCNC: 112 MG/DL — HIGH (ref 70–99)
HCG SERPL-ACNC: <4 MIU/ML — SIGNIFICANT CHANGE UP
HCT VFR BLD CALC: 35.6 % — SIGNIFICANT CHANGE UP (ref 34.5–45)
HGB BLD-MCNC: 11.2 G/DL — LOW (ref 11.5–15.5)
IMM GRANULOCYTES NFR BLD AUTO: 0.3 % — SIGNIFICANT CHANGE UP (ref 0–1.5)
LYMPHOCYTES # BLD AUTO: 2.77 K/UL — SIGNIFICANT CHANGE UP (ref 1–3.3)
LYMPHOCYTES # BLD AUTO: 23.1 % — SIGNIFICANT CHANGE UP (ref 13–44)
MCHC RBC-ENTMCNC: 31 PG — SIGNIFICANT CHANGE UP (ref 27–34)
MCHC RBC-ENTMCNC: 31.5 GM/DL — LOW (ref 32–36)
MCV RBC AUTO: 98.6 FL — SIGNIFICANT CHANGE UP (ref 80–100)
MONOCYTES # BLD AUTO: 1.29 K/UL — HIGH (ref 0–0.9)
MONOCYTES NFR BLD AUTO: 10.8 % — SIGNIFICANT CHANGE UP (ref 2–14)
NEUTROPHILS # BLD AUTO: 7.59 K/UL — HIGH (ref 1.8–7.4)
NEUTROPHILS NFR BLD AUTO: 63.2 % — SIGNIFICANT CHANGE UP (ref 43–77)
PLATELET # BLD AUTO: 375 K/UL — SIGNIFICANT CHANGE UP (ref 150–400)
POTASSIUM SERPL-MCNC: 3.8 MMOL/L — SIGNIFICANT CHANGE UP (ref 3.5–5.3)
POTASSIUM SERPL-SCNC: 3.8 MMOL/L — SIGNIFICANT CHANGE UP (ref 3.5–5.3)
PROT SERPL-MCNC: 7.6 G/DL — SIGNIFICANT CHANGE UP (ref 6.6–8.7)
RBC # BLD: 3.61 M/UL — LOW (ref 3.8–5.2)
RBC # FLD: 14.4 % — SIGNIFICANT CHANGE UP (ref 10.3–14.5)
SALICYLATES SERPL-MCNC: <0.6 MG/DL — LOW (ref 10–20)
SODIUM SERPL-SCNC: 139 MMOL/L — SIGNIFICANT CHANGE UP (ref 135–145)
TSH SERPL-MCNC: 0.89 UIU/ML — SIGNIFICANT CHANGE UP (ref 0.27–4.2)
WBC # BLD: 12 K/UL — HIGH (ref 3.8–10.5)
WBC # FLD AUTO: 12 K/UL — HIGH (ref 3.8–10.5)

## 2020-07-17 PROCEDURE — 72170 X-RAY EXAM OF PELVIS: CPT

## 2020-07-17 PROCEDURE — 36415 COLL VENOUS BLD VENIPUNCTURE: CPT

## 2020-07-17 PROCEDURE — 99285 EMERGENCY DEPT VISIT HI MDM: CPT | Mod: 25

## 2020-07-17 PROCEDURE — 96372 THER/PROPH/DIAG INJ SC/IM: CPT

## 2020-07-17 PROCEDURE — 72170 X-RAY EXAM OF PELVIS: CPT | Mod: 26

## 2020-07-17 PROCEDURE — 80053 COMPREHEN METABOLIC PANEL: CPT

## 2020-07-17 PROCEDURE — 99284 EMERGENCY DEPT VISIT MOD MDM: CPT

## 2020-07-17 PROCEDURE — 93010 ELECTROCARDIOGRAM REPORT: CPT

## 2020-07-17 PROCEDURE — 80307 DRUG TEST PRSMV CHEM ANLYZR: CPT

## 2020-07-17 PROCEDURE — 84443 ASSAY THYROID STIM HORMONE: CPT

## 2020-07-17 PROCEDURE — 85027 COMPLETE CBC AUTOMATED: CPT

## 2020-07-17 PROCEDURE — 84702 CHORIONIC GONADOTROPIN TEST: CPT

## 2020-07-17 PROCEDURE — 93005 ELECTROCARDIOGRAM TRACING: CPT

## 2020-07-17 PROCEDURE — 99285 EMERGENCY DEPT VISIT HI MDM: CPT

## 2020-07-17 RX ADMIN — MIDAZOLAM HYDROCHLORIDE 10 MILLIGRAM(S): 1 INJECTION, SOLUTION INTRAMUSCULAR; INTRAVENOUS at 00:02
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
## 2020-07-17 NOTE — ED PROVIDER NOTE - PROGRESS NOTE DETAILS
Pt awake, evaluated by , and cleared for d/c. Pt requesting d/c, denies SI/HI. WIll d/c home. Pt with steady gait and tolerating PO. - Ajit Menchaca, PGY-2
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
## 2020-07-17 NOTE — ED PROVIDER NOTE - PATIENT PORTAL LINK FT
You can access the FollowMyHealth Patient Portal offered by [facility] by registering at the following website: [email or web address]. By joining [email or web address]’s FollowMyHealth portal, you will also be able to view your health information using other applications (apps) compatible with our system.

## 2020-07-17 NOTE — ED PROVIDER NOTE - PATIENT PORTAL LINK FT
You can access the FollowMyHealth Patient Portal offered by [facility] by registering at the following website: [email or web address]. By joining [employer]’s FollowMyHealth portal, you will also be able to view your health information using other applications (apps) compatible with our system.
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
## 2020-07-17 NOTE — ED PROVIDER NOTE - NSFOLLOWUPINSTRUCTIONS_ED_ALL_ED_FT
Drug Overdose    A drug overdose happens when you take too much of a drug. An overdose can occur with illegal drugs, prescription drugs, or over-the-counter (OTC) drugs.     The effects of a drug overdose can be mild, dangerous, or even deadly.    What are the causes?  This condition may be caused by:    Taking too much of a drug by accident.  Taking too much of a drug on purpose.  An error made by a health care provider who prescribes a drug.  An error made by the pharmacist who fills the prescription order.    Drugs that commonly cause overdose include:    Mental health drugs.  Pain medicines.  Illegal drugs.  OTC cough and cold medicines.  Heart medicines.  Seizure medicines.    What increases the risk?  A drug overdose is more likely in:    Children. They may be attracted to colorful pills. Because of a child's small size, even a small amount of a drug can be dangerous.  Elderly people. They may be taking many different drugs. Elderly people may have difficulty reading labels or remembering when they last took their medicine.    The risk of a drug overdose is also higher for someone who:    Takes illegal drugs.  Takes a drug and drinks alcohol.  Has a mental health condition.    What are the signs or symptoms?  Symptoms of a drug overdose depend on the drug and the amount that was taken. Common danger signs include:    Behavior changes.  Sleepiness.  Slowed breathing.  Nausea and vomiting.  Seizures.  Changes in eye pupil size. The pupil may be very large or very small.    If there are signs and symptoms of very low blood pressure (shock) from an overdose, emergency treatment is required. These include:    Cold and clammy skin.  Pale skin.  Blue lips.  Very slow breathing.  Extreme sleepiness.  Loss of consciousness.    How is this diagnosed?  This condition may be diagnosed based on your symptoms. It is important to tell your health care provider:    All of the drugs that you took.  When you took the drugs.  Whether you were drinking alcohol.    Your health care provider will do a physical exam. This exam may include:    Checking and monitoring your heart rate and rhythm, your temperature, and your blood pressure (vital signs).  Checking your breathing and oxygen level.    You may also have tests, including:    Urine tests to check for drugs in your system.  Blood tests to check for:  Drugs in your system.  Signs of an imbalance of your blood minerals (electrolytes).  Liver damage.  Kidney damage.    How is this treated?  Supporting your vital signs and your breathing is the first step in treating a drug overdose. Treatment may also include:    Giving fluids and electrolytes through an IV tube.  Inserting a breathing tube (endotracheal tube) in your airway to help you breathe.  Passing a tube through your nose and into your stomach (NG tube, or nasogastric tube) to wash out your stomach.  Giving medicines that:  Make you vomit.  Absorb any medicine that is left in your digestive system.  Block or reverse the effect of the drug that caused the overdose.  Filtering your blood through an artificial kidney machine (hemodialysis). You may need this if your overdose is severe or if you have kidney failure.  Ongoing counseling and mental health support if you intentionally overdosed or used an illegal drug.    Follow these instructions at home:  Take medicines only as directed by your health care provider. Always ask your health care provider about possible side effects of any new drug that you start taking.  Keep a list of all of the drugs that you take, including over-the-counter medicines. Bring this list with you to all of your medical visits.  Drink enough fluid to keep your urine clear or pale yellow.  Keep all follow-up visits as directed by your health care provider. This is important.    How is this prevented?  Get help if you are struggling with:  Alcohol or drug use.  Depression or another mental health problem.  Keep the phone number of your local poison control center near your phone or on your cell phone.  Store all medicines in safety containers that are out of the reach of children.  Read the drug inserts that come with your medicines.  Do not use illegal drugs.  Do not drink alcohol when taking drugs.  Do not take medicines that are not prescribed for you.    Contact a health care provider if:  Your symptoms return.  You develop new symptoms or side effects when you take medicines.    Get help right away if:  You think that you or someone else may have taken too much of a drug. The hotline of the National Poison Control Center is (379) 241-3822.  You or someone else is having symptoms of a drug overdose.  You have serious thoughts about hurting yourself or others.  You become confused.  You have:  Chest pain.  Difficulty breathing.  A loss of consciousness.    Drug overdose is an emergency. Do not wait to see if the symptoms will go away. Get medical help right away. Call your local emergency services (911 in the [place] Do not drive yourself to the hospital.    ADDITIONAL NOTES AND INSTRUCTIONS    Please see attached resources.  Please follow up with your Primary MD in 24-48 hr.  Seek immediate medical care for any new/worsening signs or symptoms.

## 2020-07-17 NOTE — ED PROVIDER NOTE - NS ED ROS FT
Review of Systems  •	CONSTITUTIONAL - no  fever, no diaphoresis, no weight change  •	SKIN - no rash  •	HEMATOLOGIC - no bleeding, no bruising  •	EYES - no eye pain, no blurred vision  •	ENT - no change in hearing, no pain  •	RESPIRATORY - (+)  shortness of breath, (+)  cough  •	CARDIAC - no chest pain, no palpitations  •	GI - no abd pain, no nausea, no vomiting, no diarrhea, no constipation, no bleeding  •	GENITO-URINARY - no discharge, no dysuria; no hematuria,   •	ENDO - no polydipsia, no polyuria, no heat/no cold intolerance  •	MUSCULOSKELETAL - no joint pain, no swelling, no redness  •	NEUROLOGIC - no weakness, no headache, no anesthesia, no paresthesias  •	PSYCH - no anxiety, non suicidal, non homicidal, no hallucination, no depression (+) agitated

## 2020-07-17 NOTE — ED PROVIDER NOTE - UNABLE TO OBTAIN
Unable to obtain pt's complete hx secondary to pt's current condition. Pt agitated, unable to obtain hx. Severe Illness/Injury
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
## 2020-07-17 NOTE — ED BEHAVIORAL HEALTH ASSESSMENT NOTE - DESCRIPTION
DM lives in [place] agitated (per usual presentation)  asking for food and clothes  Vital Signs Last 24 Hrs  T(C): 36.8 (16 Jul 2020 23:31), Max: 36.8 (16 Jul 2020 23:31)  T(F): 98.2 (16 Jul 2020 23:31), Max: 98.2 (16 Jul 2020 23:31)  HR: 65 (17 Jul 2020 08:28) (65 - 98)  BP: 106/61 (16 Jul 2020 23:31) (106/61 - 106/61)  RR: 15 (17 Jul 2020 08:28) (15 - 18)  SpO2: 95% (17 Jul 2020 08:28) (95% - 95%)

## 2020-07-17 NOTE — ED PROVIDER NOTE - CLINICAL SUMMARY MEDICAL DECISION MAKING FREE TEXT BOX
39 yo F schizophrenia, was seen and cleared by psychiatry this morning, brought back in by police for agitation. patient refused vital signes.  Patient directable. She denies SI and HI. She want to go home. ginger ale given to patient.

## 2020-07-17 NOTE — ED PROVIDER NOTE - NEUROLOGICAL, MLM
Alert and oriented, no focal deficits, no motor or sensory deficits. Agitated, verbally aggressive, not following commands. No gross focal deficits

## 2020-07-17 NOTE — ED PROVIDER NOTE - PMH
Asthma    Bipolar 1 disorder    Depression    No pertinent past medical history    No pertinent past medical history    No pertinent past medical history    Schizo affective schizophrenia    Schizophrenia <<----- Click to add NO pertinent Past Medical History

## 2020-07-17 NOTE — ED ADULT NURSE NOTE - NSIMPLEMENTINTERV_GEN_ALL_ED
Implemented All Fall with Harm Risk Interventions:  [place] to call system. Call bell, personal items and telephone within reach. Instruct patient to call for assistance. Room bathroom lighting operational. Non-slip footwear when patient is off stretcher. Physically safe environment: no spills, clutter or unnecessary equipment. Stretcher in lowest position, wheels locked, appropriate side rails in place. Provide visual cue, wrist band, yellow gown, etc. Monitor gait and stability. Monitor for mental status changes and reorient to person, place, and time. Review medications for side effects contributing to fall risk. Reinforce activity limits and safety measures with patient and family. Provide visual clues: red socks.

## 2020-07-17 NOTE — ED BEHAVIORAL HEALTH ASSESSMENT NOTE - OTHER PAST PSYCHIATRIC HISTORY (INCLUDE DETAILS REGARDING ONSET, COURSE OF ILLNESS, INPATIENT/OUTPATIENT TREATMENT)
multiple admissions dx schizoaffective disorder most presentations also involve drug use  last at [place]   travels to [place] and has a provider there at [facility]

## 2020-07-17 NOTE — ED PROVIDER NOTE - PHYSICAL EXAMINATION
VITAL SIGNS: I have reviewed nursing notes and confirm.  CONSTITUTIONAL: Well-developed; well-nourished; (+)  agitated   SKIN: Skin exam is warm and dry, no acute rash.  HEAD: Normocephalic; atraumatic.  EYES: PERRL, EOM intact; conjunctiva and sclera clear.  ENT: No nasal discharge; airway clear. Throat clear.  NECK: Supple; non tender.    CARD: S1, S2 normal; Regular rate and rhythm.  RESP: No wheezes,  no rales or rhonchi.   ABD:  soft; non-distended; non-tender;   EXT: Normal ROM. No clubbing, cyanosis or edema.  NEURO: Alert, oriented. Grossly unremarkable. No focal deficits. no facial droop, moves all extremities,  normal gait (+) agitated with the police

## 2020-07-21 ENCOUNTER — EMERGENCY (EMERGENCY)
Facility: HOSPITAL | Age: 40
LOS: 1 days | Discharge: HOME | End: 2020-07-21
Attending: EMERGENCY MEDICINE
Payer: MEDICAID

## 2020-07-21 VITALS
TEMPERATURE: 99 F | DIASTOLIC BLOOD PRESSURE: 89 MMHG | OXYGEN SATURATION: 90 % | RESPIRATION RATE: 20 BRPM | SYSTOLIC BLOOD PRESSURE: 132 MMHG | HEART RATE: 127 BPM

## 2020-07-21 VITALS — OXYGEN SATURATION: 94 % | HEART RATE: 107 BPM | RESPIRATION RATE: 18 BRPM

## 2020-07-21 LAB
APAP SERPL-MCNC: <7.5 UG/ML — LOW (ref 10–26)
BASOPHILS # BLD AUTO: 0.04 K/UL — SIGNIFICANT CHANGE UP (ref 0–0.2)
BASOPHILS NFR BLD AUTO: 0.4 % — SIGNIFICANT CHANGE UP (ref 0–2)
EOSINOPHIL # BLD AUTO: 0.2 K/UL — SIGNIFICANT CHANGE UP (ref 0–0.5)
EOSINOPHIL NFR BLD AUTO: 2 % — SIGNIFICANT CHANGE UP (ref 0–6)
ETHANOL SERPL-MCNC: <10 MG/DL — SIGNIFICANT CHANGE UP
HCG SERPL-ACNC: <4 MIU/ML — SIGNIFICANT CHANGE UP
HCT VFR BLD CALC: 34.7 % — SIGNIFICANT CHANGE UP (ref 34.5–45)
HGB BLD-MCNC: 11 G/DL — LOW (ref 11.5–15.5)
IMM GRANULOCYTES NFR BLD AUTO: 0.5 % — SIGNIFICANT CHANGE UP (ref 0–1.5)
LYMPHOCYTES # BLD AUTO: 1.81 K/UL — SIGNIFICANT CHANGE UP (ref 1–3.3)
LYMPHOCYTES # BLD AUTO: 17.7 % — SIGNIFICANT CHANGE UP (ref 13–44)
MCHC RBC-ENTMCNC: 30.9 PG — SIGNIFICANT CHANGE UP (ref 27–34)
MCHC RBC-ENTMCNC: 31.7 GM/DL — LOW (ref 32–36)
MCV RBC AUTO: 97.5 FL — SIGNIFICANT CHANGE UP (ref 80–100)
MONOCYTES # BLD AUTO: 0.86 K/UL — SIGNIFICANT CHANGE UP (ref 0–0.9)
MONOCYTES NFR BLD AUTO: 8.4 % — SIGNIFICANT CHANGE UP (ref 2–14)
NEUTROPHILS # BLD AUTO: 7.25 K/UL — SIGNIFICANT CHANGE UP (ref 1.8–7.4)
NEUTROPHILS NFR BLD AUTO: 71 % — SIGNIFICANT CHANGE UP (ref 43–77)
PLATELET # BLD AUTO: 408 K/UL — HIGH (ref 150–400)
RBC # BLD: 3.56 M/UL — LOW (ref 3.8–5.2)
RBC # FLD: 14.7 % — HIGH (ref 10.3–14.5)
SALICYLATES SERPL-MCNC: <0.6 MG/DL — LOW (ref 10–20)
TSH SERPL-MCNC: 1.01 UIU/ML — SIGNIFICANT CHANGE UP (ref 0.27–4.2)
WBC # BLD: 10.21 K/UL — SIGNIFICANT CHANGE UP (ref 3.8–10.5)
WBC # FLD AUTO: 10.21 K/UL — SIGNIFICANT CHANGE UP (ref 3.8–10.5)

## 2020-07-21 PROCEDURE — 80053 COMPREHEN METABOLIC PANEL: CPT

## 2020-07-21 PROCEDURE — 80307 DRUG TEST PRSMV CHEM ANLYZR: CPT

## 2020-07-21 PROCEDURE — 36415 COLL VENOUS BLD VENIPUNCTURE: CPT

## 2020-07-21 PROCEDURE — 93010 ELECTROCARDIOGRAM REPORT: CPT

## 2020-07-21 PROCEDURE — 99284 EMERGENCY DEPT VISIT MOD MDM: CPT | Mod: 25

## 2020-07-21 PROCEDURE — 90792 PSYCH DIAG EVAL W/MED SRVCS: CPT

## 2020-07-21 PROCEDURE — 96372 THER/PROPH/DIAG INJ SC/IM: CPT

## 2020-07-21 PROCEDURE — 84702 CHORIONIC GONADOTROPIN TEST: CPT

## 2020-07-21 PROCEDURE — 93005 ELECTROCARDIOGRAM TRACING: CPT

## 2020-07-21 PROCEDURE — 85027 COMPLETE CBC AUTOMATED: CPT

## 2020-07-21 PROCEDURE — 84443 ASSAY THYROID STIM HORMONE: CPT

## 2020-07-21 PROCEDURE — 99284 EMERGENCY DEPT VISIT MOD MDM: CPT

## 2020-07-21 RX ORDER — MIDAZOLAM HYDROCHLORIDE 1 MG/ML
5 INJECTION, SOLUTION INTRAMUSCULAR; INTRAVENOUS ONCE
Refills: 0 | Status: DISCONTINUED | OUTPATIENT
Start: 2020-07-21 | End: 2020-07-21

## 2020-07-21 RX ADMIN — MIDAZOLAM HYDROCHLORIDE 5 MILLIGRAM(S): 1 INJECTION, SOLUTION INTRAMUSCULAR; INTRAVENOUS at 12:40

## 2020-07-21 NOTE — ED PROVIDER NOTE - PHYSICAL EXAMINATION
Constitutional: Sleepy but rousable to painful stimuli, mildly agitated when awake, spitting at people.  Eyes: no scleral icterus  HENT: normocephalic, atraumatic, moist oral mucosa  CV: mildly tachycardic, regular rhythm, no murmur  Pulm: non-labored respirations, CTAB  Abdomen: soft, non-tender, non-distended  Extremities: no edema, no deformity  Skin: no rash, no jaundice  Neuro: Mumbling incoherently, moving all extremities equally Constitutional: Sleepy but rousable to painful stimuli, agitated when awake, spitting at people.  Eyes: no scleral icterus  HENT: normocephalic, atraumatic, moist oral mucosa  CV: mildly tachycardic, regular rhythm, no murmur  Pulm: non-labored respirations, CTAB  Abdomen: soft, non-tender, non-distended  Extremities: no edema, no deformity  Skin: no rash, no jaundice  Neuro: Mumbling incoherently, moving all extremities equally

## 2020-07-21 NOTE — ED PROVIDER NOTE - CLINICAL SUMMARY MEDICAL DECISION MAKING FREE TEXT BOX
40y F w/ hx polysubstance use, schizoaffective disorder, presenting for AMS, with agitation and aggressive behavior.  Pt was just evaluated in this ED earlier this week for aggressive behavior and was seen by , who felt that pt's symptoms were 2/2 drug abuse and cleared her for discharge.  Pt given additional versed 5 mg IM for agitation.  Will obtain EKG, labs, observe and clear for BH evaluation.

## 2020-07-21 NOTE — ED BEHAVIORAL HEALTH ASSESSMENT NOTE - OTHER PAST PSYCHIATRIC HISTORY (INCLUDE DETAILS REGARDING ONSET, COURSE OF ILLNESS, INPATIENT/OUTPATIENT TREATMENT)
multiple admissions dx schizoaffective disorder most presentations also involve drug use  last at [place]   travels to [place] and has a provider there at [facility]
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
## 2020-07-21 NOTE — ED BEHAVIORAL HEALTH ASSESSMENT NOTE - SUMMARY
Patient currently appears at baseline, symptoms likely substance induced, denies any S/H I/I/P, no current symptoms to indicate need for psych admission

## 2020-07-21 NOTE — ED PROVIDER NOTE - NSFOLLOWUPINSTRUCTIONS_ED_ALL_ED_FT
Substance Use Disorder  Substance use disorder occurs when a person's repeated use of drugs or alcohol interferes with his or her ability to be productive. This disorder can cause problems with mental and physical health. It can affect your ability to have healthy relationships, and it can keep you from being able to meet your responsibilities at work, home, or school. It can also lead to addiction, which is a condition in which the person cannot stop using the substance consistently for a period of time.  Addiction changes the way the brain works. Because of these changes, addiction is a chronic condition. Substance use disorder can be mild, moderate, or severe.  The most commonly abused substances include:  Alcohol.Tobacco.Marijuana.Stimulants, such as cocaine and methamphetamine.Hallucinogens, such as LSD and PCP.Opioids, such as some prescription pain medicines and heroin.What are the causes?  This condition may develop due to many complex social, psychological, or physical reasons, such as:  Stress.Abuse.Peer pressure.Anxiety or depression.What increases the risk?  This condition is more likely to develop in people who:  Use substances to cope with stress.Have been abused.Have a mental health disorder, such as depression.Have a family history of substance use disorder.What are the signs or symptoms?  Symptoms of this condition include:  Using the substance for longer periods of time or at a higher dosage than what is normal or intended.Having a lasting desire to use the substance.Being unable to slow down or stop the use of the substance.Spending an abnormal amount of time getting the substance, using the substance, or recovering from using the substance.Using the substance in a way that interferes with work, school, social activities, and personal relationships.Using the substance even after having negative consequences, such as:  Health problems.Legal or financial troubles.Job loss.Relationship problems.Needing more and more of the substance to get the same effect (developing tolerance).Experiencing unpleasant symptoms if you do not use the substance (withdrawal).Using the substance to avoid withdrawal symptoms.How is this diagnosed?  This condition may be diagnosed based on:  A physical exam.Your history of substance use.Your symptoms. This includes:  How substance use affects your life.Changes in personality, behaviors, and mood.Having at least two symptoms of substance use disorder within a 12-month period.Health issues related to substance use, such as liver damage, shortness of breath, fatigue, cough, or heart problems.Blood or urine tests to screen for alcohol and drugs.How is this treated?  This condition may be treated by:  Stopping substance use safely. This may require taking medicines and being closely monitored for several days.Taking part in group and individual counseling from mental health providers who help people with substance use disorder.Staying at a live-in (residential) treatment center for several days or weeks.Attending daily counseling sessions at a treatment center.Taking medicine as told by your health care provider:  To ease symptoms and prevent complications during withdrawal.To treat other mental health issues, such as depression or anxiety.To block cravings by causing the same effects as the substance.To block the effects of the substance or replace good sensations with unpleasant ones.Participating in a support group to share your experience with others who are going through the same thing. These groups are an important part of long-term recovery for many people.Recovery can be a long process. Many people who undergo treatment start using the substance again after stopping (relapse). If you relapse, that does not mean that treatment will not work.  Follow these instructions at home:     Take over-the-counter and prescription medicines only as told by your health care provider.Do not use any drugs or alcohol.Avoid temptations or triggers that you associate with your use of the substance.Learn and practice techniques for managing stress.Have a plan for vulnerable moments. Get phone numbers of people who are willing to help and who are committed to your recovery.Attend support groups on a regular basis. These groups include 12-step programs like Alcoholics Anonymous and Narcotics Anonymous.Keep all follow-up visits as told by your health care providers. This is important. This includes continuing to work with therapists and support groups.Contact a health care provider if:  You cannot take your medicines as told.Your symptoms get worse.You have trouble resisting the urge to use drugs or alcohol.Get help right away if you:  Relapse.Think that you may have taken too much of a drug. The hotline of the National Poison Control Center is (756) 813-1163.Have signs of an overdose. Symptoms include:  Chest pain.Confusion.Sleepiness or difficulty staying awake.Slowed breathing.Nausea or vomiting.A seizure.Have serious thoughts about hurting yourself or someone else.Drug overdose is an emergency. Do not wait to see if the symptoms will go away. Get medical help right away. Call your local emergency services (911 in the [place] Do not drive yourself to the hospital.   If you ever feel like you may hurt yourself or others, or have thoughts about taking your own life, get help right away. You can go to your nearest emergency department or call:   Your local emergency services (911 in the [place] A suicide crisis helpline, such as the National Suicide Prevention Lifeline at 1-665.883.4112. This is open 24 hours a day. Summary  Substance use disorder occurs when a person's repeated use of drugs or alcohol interferes with his or her ability to be productive.Taking part in group and individual counseling from mental health providers is a common treatment for people with substance use disorder.Recovery can be a long process. Many people who undergo treatment start using the substance again after stopping (relapse). A relapse does not mean that treatment will not work.Attend support groups such as Alcoholics Anonymous and Narcotics Anonymous. These groups are an important part of long-term recovery for many people.This information is not intended to replace advice given to you by your health care provider. Make sure you discuss any questions you have with your health care provider.

## 2020-07-21 NOTE — ED ADULT NURSE NOTE - NSIMPLEMENTINTERV_GEN_ALL_ED
Implemented All Fall Risk Interventions:  [place] to call system. Call bell, personal items and telephone within reach. Instruct patient to call for assistance. Room bathroom lighting operational. Non-slip footwear when patient is off stretcher. Physically safe environment: no spills, clutter or unnecessary equipment. Stretcher in lowest position, wheels locked, appropriate side rails in place. Provide visual cue, wrist band, yellow gown, etc. Monitor gait and stability. Monitor for mental status changes and reorient to person, place, and time. Review medications for side effects contributing to fall risk. Reinforce activity limits and safety measures with patient and family.

## 2020-07-21 NOTE — ED ADULT NURSE NOTE - PAIN RATING/NUMBER SCALE (0-10): ACTIVITY
0 Spiral Flap Text: The defect edges were debeveled with a #15 scalpel blade.  Given the location of the defect, shape of the defect and the proximity to free margins a spiral flap was deemed most appropriate.  Using a sterile surgical marker, an appropriate rotation flap was drawn incorporating the defect and placing the expected incisions within the relaxed skin tension lines where possible. The area thus outlined was incised deep to adipose tissue with a #15 scalpel blade.  The skin margins were undermined to an appropriate distance in all directions utilizing iris scissors.
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
## 2020-07-21 NOTE — ED PROVIDER NOTE - PROGRESS NOTE DETAILS
Pt now awake and alert, ambulating around the ED.  Denies SI or HI, but says she wants to see psychiatry.  BH made aware. Pt evaluated by , cleared for discharge.

## 2020-07-21 NOTE — ED BEHAVIORAL HEALTH ASSESSMENT NOTE - DESCRIPTION
Patient somewhat irritable, appears as prior presentation  asking for food and clothes    Vital Signs Last 24 Hrs  T(C): 37.2 (21 Jul 2020 12:26), Max: 37.2 (21 Jul 2020 12:26)  T(F): 98.9 (21 Jul 2020 12:26), Max: 98.9 (21 Jul 2020 12:26)  HR: 107 (21 Jul 2020 12:49) (107 - 127)  BP: 132/89 (21 Jul 2020 12:26) (132/89 - 132/89)  BP(mean): --  RR: 18 (21 Jul 2020 12:49) (18 - 20)  SpO2: 94% (21 Jul 2020 12:49) (90% - 94%) DM lives in [place]

## 2020-07-21 NOTE — ED ADULT NURSE REASSESSMENT NOTE - NS ED NURSE REASSESS COMMENT FT1
pt status unchanged, refer to flowsheet and chart, pt safety maintained, pt hemodynamically stable, will continue to monitor
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
## 2020-07-29 ENCOUNTER — EMERGENCY (EMERGENCY)
Facility: HOSPITAL | Age: 40
LOS: 1 days | Discharge: HOME | End: 2020-07-29
Attending: STUDENT IN AN ORGANIZED HEALTH CARE EDUCATION/TRAINING PROGRAM
Payer: MEDICAID

## 2020-07-29 VITALS
RESPIRATION RATE: 18 BRPM | HEART RATE: 77 BPM | DIASTOLIC BLOOD PRESSURE: 84 MMHG | SYSTOLIC BLOOD PRESSURE: 132 MMHG | OXYGEN SATURATION: 95 %

## 2020-07-29 VITALS
SYSTOLIC BLOOD PRESSURE: 130 MMHG | HEART RATE: 72 BPM | HEIGHT: 65 IN | DIASTOLIC BLOOD PRESSURE: 85 MMHG | TEMPERATURE: 99 F | OXYGEN SATURATION: 98 % | RESPIRATION RATE: 18 BRPM

## 2020-07-29 PROCEDURE — 99284 EMERGENCY DEPT VISIT MOD MDM: CPT

## 2020-07-29 PROCEDURE — 82962 GLUCOSE BLOOD TEST: CPT

## 2020-07-29 PROCEDURE — 73630 X-RAY EXAM OF FOOT: CPT

## 2020-07-29 PROCEDURE — 99283 EMERGENCY DEPT VISIT LOW MDM: CPT

## 2020-07-29 PROCEDURE — 73630 X-RAY EXAM OF FOOT: CPT | Mod: 26,LT

## 2020-07-29 NOTE — ED PROVIDER NOTE - NS ED ROS FT
CONSTITUTIONAL: No fevers, no chills  Eyes: No vision changes  Cardiovascular: No Chest pain  Respiratory: No SOB  Gastrointestinal: No n/v/c/d, no abd pain  Genitourinary: no dysuria, no hematuria  SKIN: no rashes.  MSK: +foot pain  NEURO: no headache, no weakness, no numbness  PSYCHIATRIC: no changes in mood, no AH/VH.

## 2020-07-29 NOTE — ED PROVIDER NOTE - NSFOLLOWUPINSTRUCTIONS_ED_ALL_ED_FT
-- Please follow up with your doctor(s) within the next 3 days, but seek medical attention sooner if your symptoms persist or worsen.  Please call tomorrow for an appointment.  If you cannot follow-up with your primary doctor please return to the ED for any urgent issues.    -- You were given a copy of your labs and imaging.  Please go over these with your doctor(s).     -- If you have any worsening of symptoms or any other concerns please see your doctor or return to the nearest emergency room immediately.    -- Please continue taking your home medications as directed.  Do not use alcohol when taking any medication (especially antibiotics, tylenol or other pain medication) unless you check with the doctor or pharmacist.    ************************************************************************************************************** Fracture    A fracture is a break in one of your bones. This can occur from a variety of injuries, especially traumatic ones. Symptoms include pain, bruising, or swelling. Do not use the injured limb. If a fracture is in one of the bones below your waist, do not put weight on that limb unless instructed to do so by your healthcare provider. Crutches or a cane may have been provided. A splint or cast may have been applied by your health care provider. Make sure to keep it dry and follow up with an orthopedist as instructed.    SEEK IMMEDIATE MEDICAL CARE IF YOU HAVE ANY OF THE FOLLOWING SYMPTOMS: numbness, tingling, increasing pain, or weakness in any part of the injured limb.      -- Please follow up with your doctor(s) within the next 3 days, but seek medical attention sooner if your symptoms persist or worsen.  Please call tomorrow for an appointment.  If you cannot follow-up with your primary doctor please return to the ED for any urgent issues.    -- You were given a copy of your labs and imaging.  Please go over these with your doctor(s).     -- If you have any worsening of symptoms or any other concerns please see your doctor or return to the nearest emergency room immediately.    -- Please continue taking your home medications as directed.  Do not use alcohol when taking any medication (especially antibiotics, tylenol or other pain medication) unless you check with the doctor or pharmacist.    **************************************************************************************************************
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
## 2020-07-29 NOTE — ED PROVIDER NOTE - PATIENT PORTAL LINK FT
You can access the FollowMyHealth Patient Portal offered by [facility] by registering at the following website: [email or web address]. By joining [employer]’s FollowMyHealth portal, you will also be able to view your health information using other applications (apps) compatible with our system.
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
## 2020-07-29 NOTE — ED ADULT NURSE NOTE - NSIMPLEMENTINTERV_GEN_ALL_ED
Implemented All Universal Safety Interventions:  [place] to call system. Call bell, personal items and telephone within reach. Instruct patient to call for assistance. Room bathroom lighting operational. Non-slip footwear when patient is off stretcher. Physically safe environment: no spills, clutter or unnecessary equipment. Stretcher in lowest position, wheels locked, appropriate side rails in place.

## 2020-07-29 NOTE — ED ADULT NURSE NOTE - CHIEF COMPLAINT QUOTE
Pt was BIBA from the [facility] for Left foot pain x 2 days. VSS on arrival + peripheral pulses   VSS on arrival

## 2020-07-29 NOTE — ED PROVIDER NOTE - PHYSICAL EXAMINATION
General: well appearing, interactive, well nourished, NAD  HEENT: pupils equal and reactive, normal external ears bilaterally   Cardiac: RRR, no MRG appreciated  Resp: lungs clear to auscultation bilaterally, symmetric chest wall rise  Abd: soft, nontender, nondistended,   : no CVA tenderness  Neuro: Moving all extremities  Skin:  normal color for race  MSK:  left foot +ttp, no deformity, pulses 2+, FROM

## 2020-07-29 NOTE — ED ADULT NURSE REASSESSMENT NOTE - NS ED NURSE REASSESS COMMENT FT1
Pt. safe for discharge as per provider. Vital signs stable and as charted. Pt. at baseline neuro status. Discussed patient specific discharge teaching with pt, pt verbalized understanding. All questions answered. Belongings returned. Pt. discharged as per orders.

## 2020-07-29 NOTE — ED PROVIDER NOTE - CARE PLAN
Principal Discharge DX:	Foot pain, left Principal Discharge DX:	Closed nondisplaced fracture of second metatarsal bone of left foot, initial encounter Principal Discharge DX:	Closed nondisplaced fracture of second metatarsal bone of left foot, sequela
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
## 2020-07-29 NOTE — ED PROVIDER NOTE - PRINCIPAL DIAGNOSIS
Foot pain, left Closed nondisplaced fracture of second metatarsal bone of left foot, initial encounter Closed nondisplaced fracture of second metatarsal bone of left foot, sequela

## 2020-07-29 NOTE — ED ADULT TRIAGE NOTE - CHIEF COMPLAINT QUOTE
Pt was BIBA from the [email or web address] for Left foot pain x 2 days. VSS on arrival + peripheral pulses   VSS on arrival

## 2020-07-29 NOTE — ED PROVIDER NOTE - CARE PROVIDER_API CALL
Ralph Matson (ERIS)  Surgery  [address] [place] [address]  Phone: [phone]  Fax: [phone]  Follow Up Time:

## 2020-07-29 NOTE — ED ADULT NURSE NOTE - CHPI ED NUR SYMPTOMS NEG
no difficulty bearing weight/no abrasion/no weakness/no bruising/no back pain/no numbness/no deformity/no stiffness/no fever/no tingling

## 2020-07-29 NOTE — ED PROVIDER NOTE - PROGRESS NOTE DETAILS
Suspected fracture of second metatarsal, will give crutches, and podiatry f/u Discussed xray with radiology, no acute process, chronic changes noted

## 2020-09-10 NOTE — ED PROVIDER NOTE - PATIENT PORTAL LINK FT
Consult
You can access the FollowMyHealth Patient Portal offered by [facility] by registering at the following website: [email or web address] By joining [email or web address] FollowMyHealth portal, you will also be able to view your health information using other applications (apps) compatible with our system.

## 2020-09-18 ENCOUNTER — EMERGENCY (EMERGENCY)
Facility: HOSPITAL | Age: 40
LOS: 1 days | End: 2020-09-18
Admitting: EMERGENCY MEDICINE
Payer: OTHER GOVERNMENT

## 2020-09-18 PROCEDURE — 99283 EMERGENCY DEPT VISIT LOW MDM: CPT

## 2020-09-18 PROCEDURE — 73630 X-RAY EXAM OF FOOT: CPT | Mod: 26,LT

## 2020-10-21 NOTE — ED ADULT NURSE NOTE - ED CARDIAC RATE
Occupational Therapy   Evaluation    Name: Jeannie Hutchins  MRN: [id number]  Admitting Diagnosis:  Acute on chronic diastolic heart failure 2 Days Post-Op    Recommendations:     Discharge Recommendations: home health OT(with family assist)  Discharge Equipment Recommendations:  (TBD pending progress)  Barriers to discharge:  None    Assessment:     Jeannie Hutchins is a 86 y.o. female with a medical diagnosis of Acute on chronic diastolic heart failure.  She presents with self care and functional mobility deficits R/T generalized weakness.. Performance deficits affecting function: weakness, impaired endurance, impaired self care skills, impaired functional mobilty, gait instability, impaired balance, decreased upper extremity function, decreased coordination, decreased lower extremity function, decreased safety awareness, pain, impaired cardiopulmonary response to activity.      Rehab Prognosis: Good; patient would benefit from acute skilled OT services to address these deficits and reach maximum level of function.       Plan:     Patient to be seen 3 x/week, 5 x/week to address the above listed problems via self-care/home management, therapeutic activities, therapeutic exercises  · Plan of Care Expires: 11/04/20  · Plan of Care Reviewed with: patient, son    Subjective     Chief Complaint: neck pain  Patient/Family Comments/goals: agreeable to OT    Occupational Profile:  Living Environment: Pt lives with two of her sons that both have decreased intellect.   Previous level of function: (I) with self care and amb without AD  Roles and Routines: performs home management and enjoys gardening  Equipment Used at Home:  none  Assistance upon Discharge: family    Pain/Comfort:  Pain Rating 1: 8/10  Location 1: neck(right knee and lower back)  Pain Addressed 1: Nurse notified, Cessation of Activity, Distraction, Reposition    Patients cultural, spiritual, Taoism conflicts given the current situation: no    Objective:      Communicated with: nurse prior to session.  Patient found HOB elevated with SCD, telemetry, oxygen, pulse ox (continuous)(HF NC20L 75%) upon OT entry to room.    General Precautions: Standard, respiratory, fall   Orthopedic Precautions:N/A   Braces: N/A     Occupational Performance:    Bed Mobility:    · Patient completed Scooting/Bridging with total assist x2 from draw sheet lift to the top of the bed  · Patient completed Supine to Sit with minimum assistance and HOB elevated  · Patient completed Sit to Supine with moderate assistance and with leg lift    Functional Mobility/Transfers:  · Patient completed Sit <> Stand Transfer with moderate assistance and of 2 persons  with  hand-held assist   · Functional Mobility: The patient stood on step stool 2* high ICU bed. The patient was able to step in pce x2 with mod assist x2 person assist    Activities of Daily Living:  · Feeding:  set up assist to cut meat    · Grooming: modified independence to wipe hands and face  · Upper Body Dressing: maximal assistance to don back gown  · Lower Body Dressing: dependence to don B socks  · Toileting: dependence use of PureWick 2* urinary frequency    Cognitive/Visual Perceptual:  Cognitive/Psychosocial Skills:     -       Oriented to: Person, Place, Time and Situation   -       Follows Commands/attention:Follows two-step commands and Follows multistep  commands  -       Communication: clear/fluent  -       Memory: No Deficits noted  -       Safety awareness/insight to disability: impaired   -       Mood/Affect/Coping skills/emotional control: Appropriate to situation    Physical Exam:  Balance: -       fair  Postural examination/scapula alignment:    -       Rounded shoulders  -       Forward head  -       Kyphosis  Skin integrity: Visible skin intact  Edema:  None noted  Dominant hand: -       right  Upper Extremity Range of Motion:     -       Right Upper Extremity: WFL  -       Left Upper Extremity: WFL  Upper Extremity  Strength:    -       Right Upper Extremity: WFL except shldr (unable to actively raise her arm without assist of her left hand  -       Left Upper Extremity: WFL   Strength:    -       Right Upper Extremity: WFL  -       Left Upper Extremity: WFL    AMPAC 6 Click ADL:  AMPAC Total Score: 19    Treatment & Education:  The patient participated in OT eval and was educated re: OT role and POC. The patient was able to tolerate sitting on the EOB ~15 min with CGA/SBA. The patient was educated re: pursed lipped breathing while seated on the EOB. Vital signs were monitored throughout:   Pre: HR 71, /56, SPO2 91%, RR 18  Post:  HR 73, /75, SPO2 92%, RR 32  Education:    Patient left HOB elevated with all lines intact, call button in reach and family present    GOALS:   Multidisciplinary Problems     Occupational Therapy Goals        Problem: Occupational Therapy Goal    Goal Priority Disciplines Outcome Interventions   Occupational Therapy Goal     OT, PT/OT Ongoing, Progressing    Description: Goals to be met by: 11/4/20    Patient will increase functional independence with ADLs by performing:    UE Dressing with Modified [place] and Set-up Assistance.  LE Dressing with Modified [place] and Supervision.  Grooming while standing at sink with Modified [place] and Supervision.  Toileting from toilet with Modified [place] and Set-up Assistance for hygiene and clothing management.   Rolling to Bilateral with Modified [place].   Supine to sit with Modified [place] and Supervision.  Step transfer with Modified [place] and Supervision  Toilet transfer to toilet with Modified [place] and Supervision.  Upper extremity exercise program x15 reps per handout, with independence.  Educate the patient re: Home Safety                      History:     Past Medical History:   Diagnosis Date    Anticoagulant long-term use     Eliquis    Arthritis     Atrial fibrillation     Blood  transfusion     Carotid stenosis     CHF (congestive heart failure)     Edema     Hearing loss     [place] (hard of hearing)     Hypercholesterolemia     Hypertension     Psychiatric problem     Thyroid disease          Past Surgical History:   Procedure Laterality Date    CARDIOVERSION  10/19/2020    Procedure: Cardioversion;  Surgeon: Brandon Elias MD;  Location: [facility] CATH LAB;  Service: Cardiology;;    CARPAL TUNNEL RELEASE  2012    right hand    ESOPHAGOGASTRODUODENOSCOPY Left 8/29/2018    Procedure: EGD (ESOPHAGOGASTRODUODENOSCOPY);  Surgeon: Oniel Dorsey MD;  Location: [facility] ENDO;  Service: Endoscopy;  Laterality: Left;    HYSTERECTOMY      left carotid endartectomy  5/2006    TONSILLECTOMY      TREATMENT OF CARDIAC ARRHYTHMIA N/A 10/19/2020    Procedure: Transesophageal echo (BLADIMIR) intra-procedure log documentation;  Surgeon: Brandon Elias MD;  Location: [facility] CATH LAB;  Service: Cardiology;  Laterality: N/A;       Time Tracking:     OT Date of Treatment: 10/21/20  OT Start Time: 1208  OT Stop Time: 1238  OT Total Time (min): 30 min    Billable Minutes:Evaluation 15 (with PT)  Total Time 30    Myra Feliciano OT  10/21/2020     normal

## 2020-12-09 NOTE — ED BEHAVIORAL HEALTH ASSESSMENT NOTE - ACTIVATING EVENTS/STRESSORS
Rodney Saxena)  Cardiovascular Disease; Internal Medicine  [facility], [address] [place] [address]  Phone: [phone]  Fax: [phone]  Follow Up Time: 1 week    Moreno Cantu)  Internal Medicine; Pulmonary Disease  [address] [place] [address]  Phone: [phone]  Fax: [phone]  Follow Up Time: 1 week    PCP,   Phone: (   )    -  Fax: (   )    -  Follow Up Time: 1-3 days   Non-compliant or not receiving treatment/Substance intoxication or withdrawal

## 2021-02-11 NOTE — ED ADULT TRIAGE NOTE - NS ED NURSE AMBULANCES
Attempted to call patient for post op check. No answer.  A message was left for patient to call back if they had any questions or concerns.     Sonny Call PA-C    
[facility]

## 2021-07-27 NOTE — ED BEHAVIORAL HEALTH ASSESSMENT NOTE - NS ED BHA MED ROS GASTROINTESTINAL
Patient ambulated to restroom with steady even gait. Placed back onto cardiac 
monitor. No complaints

## 2021-09-09 NOTE — ED ADULT NURSE REASSESSMENT NOTE - NS ED NURSE REASSESS COMMENT FT1
Pressure channel 3 zeroed. Patient awake out of her room.  Patient cooperated with vital sign assessment.  Patient provided identification information to registration.  Patient mood is labile, calm and then begins to yell intermittently.  Patient responding to verbal redirection briefly.  Ambulated to bathroom and voided with a steady gait.  Safety of patient maintained.

## 2021-10-08 NOTE — ED BEHAVIORAL HEALTH ASSESSMENT NOTE - NS ED BHA MED ROS RESPIRATORY
Refill requested:   atorvastatin (Lipitor) 20 MG tablet 90 tablet 1 4/23/2021     Sig - Route: Take 1 tablet by mouth daily. - Oral      Last office visit: 08/03/2021 For: hypertension  PCP: Dr. Norton    Upcoming office visit: none    Medication refilled per protocol.    Cholesterol (mg/dL)   Date Value   07/23/2021 177     HDL (mg/dL)   Date Value   07/23/2021 61     Triglycerides (mg/dL)   Date Value   07/23/2021 104     LDL (mg/dL)   Date Value   07/23/2021 95       
No complaints

## 2021-11-15 NOTE — PROGRESS NOTE BEHAVIORAL HEALTH - NSBHADMITCOORDWITH_PSY_A_CORE
[FreeTextEntry1] : Chest x-ray PA lateral October 25, 2021\par Cardiac size is normal\par Lower lumbar sacral scoliosis\par Bronchiectatic changes right lower lung zone\par No parenchymal consolidation pleural effusions pneumothorax\par Mediastinum hilum unremarkable\par IMP no evidence for pneumonia\par \par PFT 10/6/21\par Flow rates nl\par TLC 89 %\par  DLCO  87 %\par HGB 15.0\par \par Sleep study\par April 29–April 30, 2021\par Overall mild obstructive sleep apnea\par AHI 7\par Time spent less than 90% on room air 4.3%\par Mean O2 saturation 94.5%\par Recommendation address treatment protocol with auto CPAP\par PFT 7/28/21\par Flow Rtaes nl\par Lung Volumes nl\par TTLC 95 %\par DLCO 91 % nl\par HGB 15.0\par PFT 4/22/21\par Flow  rates nl\par Lung Volumes Nl\par DLCO 96 % nl range\par  HGB 11.2\par NIOX  11 ppb 4/22/21\par \par PFT with body box August 12, 2020\par Normal flow rates\par Mild obstructive pattern with an FEV1 FVC 74\par No bronchodilator response at FEV1\par 20% response at small airways\par Normal lung volumes are\par Noted increased % predicted.\par Specific inductance and resistance normal\par Diffusion normal 95% predicted.\par Hemoglobin 13.79\par \par Pulmonary 6-minute walk step stress test August 12, 2020\par Total steps 1/10/2017\par Baseline room air O2 saturation 96%\par Desaturation at minute 6  89% with immediate recovery to 96%\par Impression minimal O2 desaturation\par No indication for portable oxygen therapy\par \par Chest x-ray PA lateral August 12, 2020\par Normal cardiac size\par Scarring left lower lung zone cannot exclude component of bronchiectasis\par Right lung is otherwise clear\par No evidence for jodi adenopathy\par No dominant pulmonary nodules or pneumothorax\par Impression bronchiectatic change left lower lung zone\par \par Blood Draw\par Persistent mild reduction IgA normal range 70\par Data review\par Alpha-1 antitrypsin titer negative\par 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

## 2022-04-08 NOTE — ED ADULT NURSE NOTE - GLASGOW COMA SCALE: EYE OPENING
(E4) spontaneous [FreeTextEntry1] : \par Izabella Rutherford returns for f/u regarding HTN, mild diastolic LV dysfunction and mild MR.

## 2022-09-12 NOTE — ED BEHAVIORAL HEALTH ASSESSMENT NOTE - NS ED BHA PLAN HOLD IN ED BH CONTACTED YN
Pt ambulated in room with ataxic gait. Pt with significant lumbar extension and bilateral LE extension upon return to bedside requiring MinAx1./3-point gait
No

## 2022-09-14 NOTE — ED BEHAVIORAL HEALTH ASSESSMENT NOTE - SUBSTANCE USE
Call back to void message from pharmacy confirmed that medication was electrical described to the pharmacy.  Did leave information for prescription as well.    Modesto Gomez NP'     Yes

## 2022-10-22 NOTE — ED ADULT NURSE NOTE - NEURO MENTATION
Patient presents with 3 days of nausea and right flank pain radiating to right lower abdomen.  She denies fevers.     Triage Assessment     Row Name 10/22/22 0039       Triage Assessment (Adult)    Airway WDL WDL       Respiratory WDL    Respiratory WDL WDL       Skin Circulation/Temperature WDL    Skin Circulation/Temperature WDL WDL       Cardiac WDL    Cardiac WDL WDL       Peripheral/Neurovascular WDL    Peripheral Neurovascular WDL WDL       Cognitive/Neuro/Behavioral WDL    Cognitive/Neuro/Behavioral WDL WDL              
normal
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
## 2023-08-09 NOTE — ED PROVIDER NOTE - ENMT, MLM
Called and left voice message for Pt. Called Pt to help get them scheduled for a sooner GI appointment. Writer left call back number.     Airway patent, Nasal mucosa clear. Mouth with normal mucosa.

## 2023-10-27 NOTE — PROGRESS NOTE BEHAVIORAL HEALTH - NSBHADMITIPBHPROVIDER_PSY_A_CORE
A responsible adult should stay with you and you should rest quietly for the rest of the day..end    Do not drink alcohol, drive, operate any heavy machinery or power tools or make any legal/important decisions for the next 24 hours.     Progress your diet as tolerated.  If you begin to experience severe pain, increased shortness of breath, racing heartbeat or a fever above 101 F, seek immediate medical attention.     Follow up with MD as instructed. Call office for results in 3 to 5 days if needed.    [phone]    mpression:  1.  Close to 6 cm ulcerated mass extending from 36 to 42 cm previous biopsy consistent poorly differentiated adenocarcinoma.  This mass is definitely touching/penetrating through muscularis propria without involving adventitia consistent with T2 lesion.  2 small lymph nodes noticed immediate vicinity to this mass measuring  5 and 6 mm there were round hypodense highly concerning for metastatic lymph node.  Another sets of lymph node noticed around the gastrohepatic GE junction area close to 7.8 and 8.7 mm initial cytology formed these lymph nodes showed benign lymphocytic tissue without any malignant cells.  Overall this lesion is consistent with T2N1.     Recommendations:  Follow-up with oncology as well as thoracic surgery as scheduled.  Continue with the PPI.  Follow the GI clinic as scheduled.     
yes